# Patient Record
Sex: FEMALE | Race: WHITE | Employment: FULL TIME | URBAN - METROPOLITAN AREA
[De-identification: names, ages, dates, MRNs, and addresses within clinical notes are randomized per-mention and may not be internally consistent; named-entity substitution may affect disease eponyms.]

---

## 2019-04-12 ENCOUNTER — OFFICE VISIT (OUTPATIENT)
Dept: PODIATRY | Facility: CLINIC | Age: 39
End: 2019-04-12

## 2019-04-12 VITALS — TEMPERATURE: 98.6 F | WEIGHT: 280 LBS | HEIGHT: 69 IN | BODY MASS INDEX: 41.47 KG/M2

## 2019-04-12 DIAGNOSIS — M79.671 PAIN IN BOTH FEET: Primary | ICD-10-CM

## 2019-04-12 DIAGNOSIS — M20.5X1 ACQUIRED HALLUX LIMITUS OF BOTH FEET: ICD-10-CM

## 2019-04-12 DIAGNOSIS — M72.2 BILATERAL PLANTAR FASCIITIS: ICD-10-CM

## 2019-04-12 DIAGNOSIS — M20.5X2 ACQUIRED HALLUX LIMITUS OF BOTH FEET: ICD-10-CM

## 2019-04-12 DIAGNOSIS — M79.672 PAIN IN BOTH FEET: Primary | ICD-10-CM

## 2019-04-12 RX ORDER — AMLODIPINE BESYLATE 10 MG/1
10 TABLET ORAL DAILY
COMMUNITY
End: 2021-07-22

## 2019-04-12 RX ORDER — LOSARTAN POTASSIUM 50 MG/1
100 TABLET ORAL DAILY
COMMUNITY

## 2019-04-12 ASSESSMENT — ENCOUNTER SYMPTOMS
SHORTNESS OF BREATH: 0
DIARRHEA: 0
NAUSEA: 0
BACK PAIN: 1
CONSTIPATION: 0

## 2019-04-12 NOTE — PATIENT INSTRUCTIONS
Patient Education        Plantar Fasciitis: Exercises  Your Care Instructions  Here are some examples of typical rehabilitation exercises for your condition. Start each exercise slowly. Ease off the exercise if you start to have pain. Your doctor or physical therapist will tell you when you can start these exercises and which ones will work best for you. How to do the exercises  Towel stretch    1. Sit with your legs extended and knees straight. 2. Place a towel around your foot just under the toes. 3. Hold each end of the towel in each hand, with your hands above your knees. 4. Pull back with the towel so that your foot stretches toward you. 5. Hold the position for at least 15 to 30 seconds. 6. Repeat 2 to 4 times a session, up to 5 sessions a day. Calf stretch    1. Stand facing a wall with your hands on the wall at about eye level. Put the leg you want to stretch about a step behind your other leg. 2. Keeping your back heel on the floor, bend your front knee until you feel a stretch in the back leg. 3. Hold the stretch for 15 to 30 seconds. Repeat 2 to 4 times. Plantar fascia and calf stretch    1. Stand on a step as shown above. Be sure to hold on to the banister. 2. Slowly let your heels down over the edge of the step as you relax your calf muscles. You should feel a gentle stretch across the bottom of your foot and up the back of your leg to your knee. 3. Hold the stretch about 15 to 30 seconds, and then tighten your calf muscle a little to bring your heel back up to the level of the step. Repeat 2 to 4 times. Towel curls    1. While sitting, place your foot on a towel on the floor and scrunch the towel toward you with your toes. 2. Then, also using your toes, push the towel away from you. [place] pickups    1. Put marbles on the floor next to a cup.  2. Using your toes, try to lift the marbles up from the floor and put them in the cup.     Follow-up care is a key part of to 4 times. Plantar fascia and calf stretch    4. Stand on a step as shown above. Be sure to hold on to the banister. 5. Slowly let your heels down over the edge of the step as you relax your calf muscles. You should feel a gentle stretch across the bottom of your foot and up the back of your leg to your knee. 6. Hold the stretch about 15 to 30 seconds, and then tighten your calf muscle a little to bring your heel back up to the level of the step. Repeat 2 to 4 times. Towel curls    3. While sitting, place your foot on a towel on the floor and scrunch the towel toward you with your toes. 4. Then, also using your toes, push the towel away from you. [place] pickups    3. Put marbles on the floor next to a cup.  4. Using your toes, try to lift the marbles up from the floor and put them in the cup. Follow-up care is a key part of your treatment and safety. Be sure to make and go to all appointments, and call your doctor if you are having problems. It's also a good idea to know your test results and keep a list of the medicines you take. Where can you learn more? Go to https://Domino.TicketsNow. org and sign in to your [employer] account. Enter P320 in the Bizible box to learn more about \"Plantar Fasciitis: Exercises. \"     If you do not have an account, please click on the \"Sign Up Now\" link. Current as of: September 20, 2018  Content Version: 11.9  © 4821-3806 [employer]. Care instructions adapted under license by [facility] ([facility]). If you have questions about a medical condition or this instruction, always ask your healthcare professional. [address] any warranty or liability for your use of this information.

## 2019-04-12 NOTE — PROGRESS NOTES
Sid Brar  (1980)    4/12/19    Subjective     Sid Brar is 41 y.o. female who complains today of:    Chief Complaint   Patient presents with    Foot Pain     HEEL SPURS    Foot Pain     PLANTAR FASCIITIS        HPI: Patient presents with complaint of heel spurs and plantar fasciitis. Patient reports having heel pain for the past month. The patient has been experiencing post-static dyskinesia. She states the left foot is more severe than the right. Patient denies recent trauma. The patient denies similar episodes of pain in the past.  Patient has been taking over-the-counter ibuprofen intermittently. The patient has also obtained medical grade orthotics and arch strappings. She finds the arch strap to be more effective than the orthotic. Patient denies having recent x-rays. 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 The patient works 12 hour shifts which require her to be on her feet most of the time. Patient currently wears Nike running shoes to work. Review of Systems   Constitutional: Negative for fever. HENT: Negative for hearing loss. Respiratory: Negative for shortness of breath. Gastrointestinal: Negative for constipation, diarrhea and nausea. Genitourinary: Negative for difficulty urinating. Musculoskeletal: Positive for back pain. Negative for neck pain. Skin: Negative for rash. Neurological: Negative for headaches. Hematological: Does not bruise/bleed easily. Psychiatric/Behavioral: Positive for sleep disturbance. She has not tried physical therapy. Date of last of last PT treatment: none    The patient is not a diabetic. PCP/ Endocrinologist: Dr. Candie Vila   Date last seen: 1/2019    Allergies:  Patient has no known allergies.     Current Outpatient Medications on File Prior to Visit   Medication Sig Dispense Refill    losartan (COZAAR) 50 MG tablet Take 50 mg by mouth daily      amLODIPine (NORVASC) 10 MG tablet Take 10 mg by mouth daily       No current facility-administered medications on file prior to visit. Past Medical History:   Diagnosis Date    H/O: hypertension      History reviewed. No pertinent surgical history. Social History     Socioeconomic History    Marital status: Unknown     Spouse name: Not on file    Number of children: Not on file    Years of education: Not on file    Highest education level: Not on file   Occupational History    Not on file   Social Needs    Financial resource strain: Not on file    Food insecurity:     Worry: Not on file     Inability: Not on file    Transportation needs:     Medical: Not on file     Non-medical: Not on file   Tobacco Use    Smoking status: Never Smoker   Substance and Sexual Activity    Alcohol use: Yes    Drug use: Not on file    Sexual activity: Not on file   Lifestyle    Physical activity:     Days per week: Not on file     Minutes per session: Not on file    Stress: Not on file   Relationships    Social connections:     Talks on phone: Not on file     Gets together: Not on file     Attends Hoahaoism service: Not on file     Active member of club or organization: Not on file     Attends meetings of clubs or organizations: Not on file     Relationship status: Not on file    Intimate partner violence:     Fear of current or ex partner: Not on file     Emotionally abused: Not on file     Physically abused: Not on file     Forced sexual activity: Not on file   Other Topics Concern    Not on file   Social History Narrative    Not on file     History reviewed. No pertinent family history. Objective:   Vitals:  Temp 98.6 °F (37 °C)   Ht 5' 9\" (1.753 m)   Wt 280 lb (127 kg)   BMI 41.35 kg/m² Pain Score:   7    Physical Exam    Constitutional:     Well nourished and well developed. Appears neat and clean. Patient is alert, oriented x3, and in no apparent distress.      Vascular:   Dorsalis pedis pulse is palpable bilateral foot.  Posterior tibial pulse is palpable bilateral foot. There is no edema noted to the bilateral lower extremity. Capillary refill is immediate bilateral hallux. There are no varicosities noted bilaterally. There are no telangiectasia noted bilaterally. Skin temperature gradient is noted to be warm to warm bilaterally. There are no signs of ischemia or skin atrophy noted bilaterally. Hair growth is present bilaterally. Neurological:   Light touch and protective sensation is intact bilaterally. Patellar deep tendon reflex is graded at 2/4 bilaterally. Achilles tendon deep tendon reflex is graded at 2/4 bilaterally. The Babinski response is negative bilaterally. The patient is able to abduct the fifth toe on command bilaterally. Dermatological:  No open lesions noted bilateral foot. Focal hyperkeratotic lesions noted to the medial aspect of the bilateral hallux. The toenails are within normal limits and are well groomed bilaterally. Normal skin turgor noted bilaterally. Webspace 1-4 is dry and intact bilateral foot. Musculoskeletal:  Rectus foot type noted bilaterally. Manual muscle strength is graded at 5/5 for all groups tested bilateral foot. Gross static deformities noted: mild Mervin's deformity noted to the posterior/ lateral calcaneus right foot. Pain or crepitus noted with active or passive range of motion of the joints of the foot or ankle: none. Functional hallux limitus noted bilaterally, no pain or crepitus with range of motion, severely decreased dorsiflexion noted with simulated weightbearing. Decreased ankle joint dorsiflexion bilaterally, soft endpoint noted. There is tenderness to palpation of the bilateral medial calcaneal tubercle, there is pain on palpation of the left foot lateral calcaneal tubercle. There is no pain with medial to lateral compression of the heel bilaterally. Psychiatric:    Judgement and insight intact. Short and long term memory intact. No evidence of depression, anxiety, or agitation. Patient is calm, cooperative, and communicative. Appropriate interactions and affect. Assessment:      Diagnosis Orders   1. Pain in both feet  HI INJECT TENDON SHEATH/LIGAMENT   2. Bilateral plantar fasciitis  HI INJECT TENDON SHEATH/LIGAMENT    HI FT INSERT UCB AMANDA SHELL   3. Acquired hallux limitus of both feet  HI FT INSERT UCB AMANDA SHELL       Plan:     Plantar fasciitis:  I discussed the nature and etiology of the condition with the patient in detail. Posterior calf stretching exercises were demonstrated to patient and these are to be performed three times per day, a detailed home exercise plan was dispensed to the patient. An icepack is to be applied to the heel for 10 minutes after the stretching is performed. Patient instructed to modify her existing off-the-shelf orthotics with a first ray cut out. Replaced for custom orthotics, we may consider this in the future. She may alternate the orthotics with the arch strapping. We will order x-rays at the next visit if her symptoms persist.    Due to the recalcitrant nature of the pain, it was decided today to proceed with an injection to the left plantar fascia origin. SURGEON: Benoit Alonso DPM     PRE-OP DIAGNOSIS: plantar fasciitis left foot                                POST-OP DIAGNOSIS: Same    The potential risks and complications, including but not limited to, infection were discussed with the patient, informed consent was obtained. The left foot was prepped in the usual aseptic manner. The following procedures were then performed:     Procedure: Local anesthetic/ corticosteroid injection. Attention was directed to the medial aspect of the left heel. The point of maximal tenderness along the origin of the plantar fascia was identified.     An injection consisting of 1 mL of 0.5% Marcaine plain, 1 mL of 10 mg/mL dexamethasone, and 1 mL of 10 mg/mL betamethasone was administered at the site. A bandage was applied. The patient tolerated the procedure well and left the operating room in apparent good condition. After care instructions were discussed prior to discharge. Orders Placed This Encounter   Procedures    SD INJECT TENDON SHEATH/LIGAMENT    SD FT INSERT UCB AMANDA SHELL     Prescriptioin: Qty 2     Standing Status:   Future     Standing Expiration Date:   7/11/2019         Follow up:  Return in about 2 weeks (around 4/26/2019). Aida Carver DPM      Please note that this report has been partially produced using speech recognition software which may cause errors including grammar, punctuation, and spelling or words and phrases that may seem inappropriate. If there are questions or concerns please feel free to contact me for clarification.

## 2019-04-17 ENCOUNTER — TELEPHONE (OUTPATIENT)
Dept: PODIATRY | Facility: CLINIC | Age: 39
End: 2019-04-17

## 2019-04-24 NOTE — TELEPHONE ENCOUNTER
I called the patient to explain her her insurance details for orthotics. I left a voicemail for patient to call.

## 2019-04-26 ENCOUNTER — OFFICE VISIT (OUTPATIENT)
Dept: PODIATRY | Facility: CLINIC | Age: 39
End: 2019-04-26

## 2019-04-26 VITALS — HEIGHT: 69 IN | WEIGHT: 290 LBS | BODY MASS INDEX: 42.95 KG/M2

## 2019-04-26 DIAGNOSIS — M20.5X2 ACQUIRED HALLUX LIMITUS OF BOTH FEET: ICD-10-CM

## 2019-04-26 DIAGNOSIS — M79.672 PAIN IN BOTH FEET: Primary | ICD-10-CM

## 2019-04-26 DIAGNOSIS — M79.671 PAIN IN BOTH FEET: Primary | ICD-10-CM

## 2019-04-26 DIAGNOSIS — M72.2 BILATERAL PLANTAR FASCIITIS: ICD-10-CM

## 2019-04-26 DIAGNOSIS — M20.5X1 ACQUIRED HALLUX LIMITUS OF BOTH FEET: ICD-10-CM

## 2019-04-26 ASSESSMENT — ENCOUNTER SYMPTOMS
NAUSEA: 0
BACK PAIN: 1
DIARRHEA: 0
CONSTIPATION: 0
SHORTNESS OF BREATH: 0

## 2019-04-26 NOTE — PROGRESS NOTES
[facility]  (1980)    4/26/19    Subjective     [facility] is 41 y.o. female who complains today of:    Chief Complaint   Patient presents with    Foot Pain       HPI: The patient present for follow-up for bilateral plantar fasciitis. She reports a 40% decrease in her pain. Her pain is now rated at 6/10 after her work shifts. Patient reports compliance with home going instructions including stretching, icing, use of foot strapping and use of arch supports. The patient states that the left foot is still more painful than the right foot. She continues to have post-static dyskinesia. Review of Systems   Constitutional: Negative for fever. HENT: Negative for hearing loss. Respiratory: Negative for shortness of breath. Gastrointestinal: Negative for constipation, diarrhea and nausea. Genitourinary: Negative for difficulty urinating. Musculoskeletal: Positive for back pain. Negative for neck pain. Skin: Negative for rash. Neurological: Negative for headaches. Hematological: Does not bruise/bleed easily. Psychiatric/Behavioral: Positive for sleep disturbance. She has not tried physical therapy. Date of last of last PT treatment: none      Allergies:  Patient has no known allergies. Current Outpatient Medications on File Prior to Visit   Medication Sig Dispense Refill    losartan (COZAAR) 50 MG tablet Take 50 mg by mouth daily      amLODIPine (NORVASC) 10 MG tablet Take 10 mg by mouth daily       No current facility-administered medications on file prior to visit. Past Medical History:   Diagnosis Date    H/O: hypertension      History reviewed. No pertinent surgical history.   Social History     Socioeconomic History    Marital status: Unknown     Spouse name: Not on file    Number of children: Not on file    Years of education: Not on file    Highest education level: Not on file   Occupational History    Not on file   Social Needs  Financial resource strain: Not on [place] insecurity:     Worry: Not on file     Inability: Not on file    Transportation needs:     Medical: Not on file     Non-medical: Not on file   Tobacco Use    Smoking status: Never Smoker    Smokeless tobacco: Never Used   Substance and Sexual Activity    Alcohol use: Yes    Drug use: Not on file    Sexual activity: Not on file   Lifestyle    Physical activity:     Days per week: Not on file     Minutes per session: Not on file    Stress: Not on file   Relationships    Social connections:     Talks on phone: Not on file     Gets together: Not on file     Attends Sikhism service: Not on file     Active member of club or organization: Not on file     Attends meetings of clubs or organizations: Not on file     Relationship status: Not on file    Intimate partner violence:     Fear of current or ex partner: Not on file     Emotionally abused: Not on file     Physically abused: Not on file     Forced sexual activity: Not on file   Other Topics Concern    Not on file   Social History Narrative    Not on file     History reviewed. No pertinent family history. Objective:   Vitals:  Ht 5' 9\" (1.753 m)   Wt 290 lb (131.5 kg)   BMI 42.83 kg/m² Pain Score:   6    Physical Exam    Vascular:   Dorsalis pedis pulse is palpable bilateral foot. Posterior tibial pulse is palpable bilateral foot. There is no edema noted to the bilateral lower extremity. Capillary refill is immediate bilateral hallux. There are no varicosities noted bilaterally. There are no telangiectasia noted bilaterally. Skin temperature gradient is noted to be warm to warm bilaterally. There are no signs of ischemia or skin atrophy noted bilaterally. Hair growth is present bilaterally.     Neurological:   Light touch and protective sensation is intact bilaterally. Patellar deep tendon reflex is graded at 2/4 bilaterally.   Achilles tendon deep tendon reflex is graded at 2/4 bilaterally. The Babinski response is negative bilaterally.     Dermatological:  No open lesions noted bilateral foot. Focal hyperkeratotic lesions noted to the medial aspect of the bilateral hallux. The toenails are within normal limits and are well groomed bilaterally. Normal skin turgor noted bilaterally. Webspace 1-4 is dry and intact bilateral foot.     Musculoskeletal:  Rectus foot type noted bilaterally. Manual muscle strength is graded at 5/5 for all groups tested bilateral foot. Mild Mervin's deformity noted to the posterior/ lateral calcaneus right foot. No pain or crepitus noted with active or passive range of motion of the joints of the foot or ankle. Functional hallux limitus noted bilaterally, no pain or crepitus with range of motion, severely decreased dorsiflexion noted with simulated weightbearing. Decreased ankle joint dorsiflexion bilaterally, soft endpoint noted. There is tenderness to palpation of the bilateral medial calcaneal tubercle, there is pain on palpation of the left foot lateral calcaneal tubercle. There is no pain with medial to lateral compression of the heel bilaterally. Assessment:      Diagnosis Orders   1. Pain in both feet  NC INJECT TENDON SHEATH/LIGAMENT   2. Bilateral plantar fasciitis  NC INJECT TENDON SHEATH/LIGAMENT   3. Acquired hallux limitus of both feet         Plan:     Due to the recalcitrant nature of the pain, it was decided today to proceed with an additional injection to the left foot plantar fascia origin. The patient is instructed to continue previoussly enacted conservative measures including stretching, icing, foot strap, arch support. Procedure Note:    SURGEON: Jerod Guo DPM     PRE-OP DIAGNOSIS: plantar fasciitis                                POST-OP DIAGNOSIS: Same    The potential risks and complications, including but not limited to, infection were discussed with the patient, informed consent was obtained.     The left foot was prepped in the usual aseptic manner. The following procedures were then performed:     Procedure: Local anesthetic/ corticosteroid injection. Attention was directed to the medial aspect of the left heel. The point of maximal tenderness along the origin of the plantar fascia was identified. An injection consisting of 1 mL of 0.5% Marcaine plain, 1 mL of 10 mg/mL dexamethasone, and 1 mL of 10 mg/mL betamethasone was administered at the site. A bandage was applied. The patient tolerated the procedure well and left the operating room in apparent good condition. After care instructions were discussed prior to discharge. Orders Placed This Encounter   Procedures    IA INJECT TENDON SHEATH/LIGAMENT         Follow up:  Return in about 1 month (around 5/26/2019). Kamryn Andrade DPM      Please note that this report has been partially produced using speech recognition software which may cause errors including grammar, punctuation, and spelling or words and phrases that may seem inappropriate. If there are questions or concerns please feel free to contact me for clarification.

## 2019-05-22 ENCOUNTER — OFFICE VISIT (OUTPATIENT)
Dept: PODIATRY | Facility: CLINIC | Age: 39
End: 2019-05-22

## 2019-05-22 VITALS — BODY MASS INDEX: 41.47 KG/M2 | WEIGHT: 280 LBS | HEIGHT: 69 IN | TEMPERATURE: 97.6 F

## 2019-05-22 DIAGNOSIS — M72.2 BILATERAL PLANTAR FASCIITIS: ICD-10-CM

## 2019-05-22 DIAGNOSIS — M20.5X2 ACQUIRED HALLUX LIMITUS OF BOTH FEET: ICD-10-CM

## 2019-05-22 DIAGNOSIS — M79.671 PAIN IN BOTH FEET: Primary | ICD-10-CM

## 2019-05-22 DIAGNOSIS — M79.672 PAIN IN BOTH FEET: Primary | ICD-10-CM

## 2019-05-22 DIAGNOSIS — M20.5X1 ACQUIRED HALLUX LIMITUS OF BOTH FEET: ICD-10-CM

## 2019-05-22 ASSESSMENT — ENCOUNTER SYMPTOMS
NAUSEA: 0
BACK PAIN: 1
SHORTNESS OF BREATH: 0
DIARRHEA: 0
CONSTIPATION: 0

## 2019-05-22 NOTE — PROGRESS NOTES
Worry: Not on file     Inability: Not on file    Transportation needs:     Medical: Not on file     Non-medical: Not on file   Tobacco Use    Smoking status: Never Smoker    Smokeless tobacco: Never Used   Substance and Sexual Activity    Alcohol use: Yes    Drug use: Not on file    Sexual activity: Not on file   Lifestyle    Physical activity:     Days per week: Not on file     Minutes per session: Not on file    Stress: Not on file   Relationships    Social connections:     Talks on phone: Not on file     Gets together: Not on file     Attends Jewish service: Not on file     Active member of club or organization: Not on file     Attends meetings of clubs or organizations: Not on file     Relationship status: Not on file    Intimate partner violence:     Fear of current or ex partner: Not on file     Emotionally abused: Not on file     Physically abused: Not on file     Forced sexual activity: Not on file   Other Topics Concern    Not on file   Social History Narrative    Not on file     History reviewed. No pertinent family history. Objective:   Vitals:  Temp 97.6 °F (36.4 °C) (Tympanic)   Ht 5' 9\" (1.753 m)   Wt 280 lb (127 kg)   BMI 41.35 kg/m² Pain Score:   4    Physical Exam  Vascular:   Dorsalis pedis pulse is palpable bilateral foot. Posterior tibial pulse is palpable bilateral foot. There is no edema noted to the bilateral lower extremity. Capillary refill is immediate bilateral hallux. There are no varicosities noted bilaterally. There are no telangiectasia noted bilaterally. Skin temperature gradient is noted to be warm to warm bilaterally. There are no signs of ischemia or skin atrophy noted bilaterally. Hair growth is present bilaterally.     Neurological:   Light touch and protective sensation is intact bilaterally. Patellar deep tendon reflex is graded at 2/4 bilaterally. Achilles tendon deep tendon reflex is graded at 2/4 bilaterally.   The Babinski response is negative bilaterally.     Dermatological:  No open lesions noted bilateral foot. Focal hyperkeratotic lesions noted to the medial aspect of the bilateral hallux. The toenails are within normal limits and are well groomed bilaterally. Normal skin turgor noted bilaterally. Webspace 1-4 is dry and intact bilateral foot.     Musculoskeletal:  Rectus foot type noted bilaterally. Manual muscle strength is graded at 5/5 for all groups tested bilateral foot. Mild Mervin's deformity noted to the posterior/ lateral calcaneus right foot. No pain or crepitus noted with active or passive range of motion of the joints of the foot or ankle. Functional hallux limitus noted bilaterally, no pain or crepitus with range of motion, severely decreased dorsiflexion noted with simulated weightbearing. Decreased ankle joint dorsiflexion bilaterally, soft endpoint noted. There is tenderness to palpation of the bilateral medial calcaneal tubercle, there is no longer pain on palpation of the left foot lateral calcaneal tubercle. Radiographs: no acute cortical defects noted, no appreciable stress reaction noted to the calcaneus. There is an enthesophyte formation noted at the insertion of the Achilles tendon and a small infracalcaneal spur noted at the plantar fascia origin. Assessment:      Diagnosis Orders   1. Pain in both feet  XR Foot Left Ap Lateral and Oblique Standing    Ambulatory referral to Physical Therapy   2. Bilateral plantar fasciitis  XR Foot Left Ap Lateral and Oblique Standing    Ambulatory referral to Physical Therapy   3. Acquired hallux limitus of both feet         Plan:     Radiographs of the left foot were obtained and reviewed. Patient is to continue the previously enacted conservative measures. An order for formal physical therapy was dispensed to the patient. Patient is to call/return to clinic sooner should the severe symptoms recur.     Orders Placed This Encounter Procedures    XR Foot Left Ap Lateral and Oblique Standing     Standing Status:   Future     Standing Expiration Date:   5/22/2020     Order Specific Question:   Reason for exam:     Answer:   pain, plantar fasciitis (heel spur)    Ambulatory referral to Physical Therapy     Referral Priority:   Routine     Referral Type:   Eval and Treat     Requested Specialty:   Physical Therapy     Number of Visits Requested:   1         Follow up:  Return in about 6 weeks (around 7/3/2019). Agueda Alex DPM      Please note that this report has been partially produced using speech recognition software which may cause errors including grammar, punctuation, and spelling or words and phrases that may seem inappropriate. If there are questions or concerns please feel free to contact me for clarification.

## 2019-07-01 ENCOUNTER — OFFICE VISIT (OUTPATIENT)
Dept: PODIATRY | Facility: CLINIC | Age: 39
End: 2019-07-01

## 2019-07-01 VITALS — TEMPERATURE: 98.1 F | WEIGHT: 280 LBS | BODY MASS INDEX: 41.47 KG/M2 | HEIGHT: 69 IN

## 2019-07-01 DIAGNOSIS — M76.62 TENDONITIS, ACHILLES, LEFT: ICD-10-CM

## 2019-07-01 DIAGNOSIS — M79.672 PAIN IN BOTH FEET: Primary | ICD-10-CM

## 2019-07-01 DIAGNOSIS — M72.2 BILATERAL PLANTAR FASCIITIS: ICD-10-CM

## 2019-07-01 DIAGNOSIS — M79.671 PAIN IN BOTH FEET: Primary | ICD-10-CM

## 2019-07-01 DIAGNOSIS — M76.72 PERONEAL TENDONITIS, LEFT: ICD-10-CM

## 2019-07-01 RX ORDER — MELOXICAM 15 MG/1
15 TABLET ORAL DAILY
Qty: 30 TABLET | Refills: 0 | Status: ON HOLD | OUTPATIENT
Start: 2019-07-01 | End: 2021-06-29 | Stop reason: ALTCHOICE

## 2019-07-01 ASSESSMENT — ENCOUNTER SYMPTOMS
SHORTNESS OF BREATH: 0
NAUSEA: 0
CONSTIPATION: 0
BACK PAIN: 1
DIARRHEA: 0

## 2019-07-01 NOTE — PROGRESS NOTES
Kristopher Morrison  (1980)    7/1/19    Subjective     Kristopher Morrison is 41 y.o. female who complains today of:    Chief Complaint   Patient presents with    Foot Pain       HPI: the patient presents for follow-up for bilateral plantar fasciitis. The patient states that she did receive the night splint and begin using it. The patient reports decreased pain to the left plantar first steps out of bed in the morning. However the patient has developed new pain to the left foot and ankle and has noticed swelling to the lateral left ankle. The patient does not recall trauma to the left foot or ankle. No bruising has developed in the area. Patient has been taking four 200 mg over-the-counter ibuprofen twice daily, this has been ineffective. Review of Systems   Constitutional: Negative for fever. HENT: Negative for hearing loss. Respiratory: Negative for shortness of breath. Gastrointestinal: Negative for constipation, diarrhea and nausea. Genitourinary: Negative for difficulty urinating. Musculoskeletal: Positive for back pain. Negative for neck pain. Skin: Negative for rash. Neurological: Negative for headaches. Hematological: Does not bruise/bleed easily. Psychiatric/Behavioral: Positive for sleep disturbance. She has not tried physical therapy. Date of last of last PT treatment: none        Allergies:  Patient has no known allergies. Current Outpatient Medications on File Prior to Visit   Medication Sig Dispense Refill    losartan (COZAAR) 50 MG tablet Take 50 mg by mouth daily      amLODIPine (NORVASC) 10 MG tablet Take 10 mg by mouth daily       No current facility-administered medications on file prior to visit. Past Medical History:   Diagnosis Date    H/O: hypertension      History reviewed. No pertinent surgical history.   Social History     Socioeconomic History    Marital status: Unknown     Spouse name: Not on file    Number of children: Not on

## 2019-08-09 ENCOUNTER — OFFICE VISIT (OUTPATIENT)
Dept: PODIATRY | Facility: CLINIC | Age: 39
End: 2019-08-09

## 2019-08-09 VITALS — TEMPERATURE: 97.6 F | WEIGHT: 280 LBS | BODY MASS INDEX: 41.47 KG/M2 | HEIGHT: 69 IN

## 2019-08-09 DIAGNOSIS — M76.72 PERONEAL TENDONITIS, LEFT: ICD-10-CM

## 2019-08-09 DIAGNOSIS — M79.671 PAIN IN BOTH FEET: Primary | ICD-10-CM

## 2019-08-09 DIAGNOSIS — M72.2 BILATERAL PLANTAR FASCIITIS: ICD-10-CM

## 2019-08-09 DIAGNOSIS — M79.672 PAIN IN BOTH FEET: Primary | ICD-10-CM

## 2019-08-09 RX ORDER — MELOXICAM 15 MG/1
15 TABLET ORAL DAILY PRN
Qty: 30 TABLET | Refills: 1 | Status: SHIPPED | OUTPATIENT
Start: 2019-08-09 | End: 2021-07-07

## 2019-08-09 ASSESSMENT — ENCOUNTER SYMPTOMS
BACK PAIN: 1
NAUSEA: 0
CONSTIPATION: 0
DIARRHEA: 0
SHORTNESS OF BREATH: 0

## 2019-08-09 NOTE — PROGRESS NOTES
recommended the patient not go to work if she is having severe pain of the heels due to the nature of her position. We did discuss that plantar fasciitis is usually a self-limiting problem, we will have to continue to monitor the situation closely. Orders Placed This Encounter   Medications    meloxicam (MOBIC) 15 MG tablet     Sig: Take 1 tablet by mouth daily as needed for Pain     Dispense:  30 tablet     Refill:  1         Follow up:  Return in about 6 weeks (around 9/20/2019). Amarjit Angelo DPM      Please note that this report has been partially produced using speech recognition software which may cause errors including grammar, punctuation, and spelling or words and phrases that may seem inappropriate. If there are questions or concerns please feel free to contact me for clarification.

## 2019-09-23 ENCOUNTER — OFFICE VISIT (OUTPATIENT)
Dept: PODIATRY | Facility: CLINIC | Age: 39
End: 2019-09-23

## 2019-09-23 VITALS — TEMPERATURE: 98 F | BODY MASS INDEX: 41.47 KG/M2 | WEIGHT: 280 LBS | HEIGHT: 69 IN

## 2019-09-23 DIAGNOSIS — M79.672 LEFT FOOT PAIN: Primary | ICD-10-CM

## 2019-09-23 DIAGNOSIS — M76.62 TENDONITIS, ACHILLES, LEFT: ICD-10-CM

## 2019-09-23 DIAGNOSIS — M72.2 PLANTAR FASCIITIS: ICD-10-CM

## 2019-09-23 ASSESSMENT — ENCOUNTER SYMPTOMS
SHORTNESS OF BREATH: 0
NAUSEA: 0
VOMITING: 0

## 2020-08-14 ENCOUNTER — OFFICE VISIT (OUTPATIENT)
Dept: PODIATRY | Facility: CLINIC | Age: 40
End: 2020-08-14

## 2020-08-14 VITALS — HEIGHT: 69 IN | TEMPERATURE: 97.4 F | WEIGHT: 293 LBS | BODY MASS INDEX: 43.4 KG/M2

## 2020-08-14 RX ORDER — METHYLPREDNISOLONE 4 MG/1
TABLET ORAL
Qty: 1 KIT | Refills: 0 | Status: SHIPPED | OUTPATIENT
Start: 2020-08-14 | End: 2020-08-20

## 2020-08-14 ASSESSMENT — ENCOUNTER SYMPTOMS
SHORTNESS OF BREATH: 0
NAUSEA: 0
VOMITING: 0

## 2020-08-14 NOTE — PROGRESS NOTES
H/O: hypertension      History reviewed. No pertinent surgical history. Social History     Socioeconomic History    Marital status: Unknown     Spouse name: Not on file    Number of children: Not on file    Years of education: Not on file    Highest education level: Not on file   Occupational History    Not on file   Social Needs    Financial resource strain: Not on file    Food insecurity     Worry: Not on file     Inability: Not on file    Transportation needs     Medical: Not on file     Non-medical: Not on file   Tobacco Use    Smoking status: Never Smoker    Smokeless tobacco: Never Used   Substance and Sexual Activity    Alcohol use: Yes    Drug use: Not on file    Sexual activity: Not on file   Lifestyle    Physical activity     Days per week: Not on file     Minutes per session: Not on file    Stress: Not on file   Relationships    Social connections     Talks on phone: Not on file     Gets together: Not on file     Attends Congregation service: Not on file     Active member of club or organization: Not on file     Attends meetings of clubs or organizations: Not on file     Relationship status: Not on file    Intimate partner violence     Fear of current or ex partner: Not on file     Emotionally abused: Not on file     Physically abused: Not on file     Forced sexual activity: Not on file   Other Topics Concern    Not on file   Social History Narrative    Not on file     History reviewed. No pertinent family history. Objective:   Vitals:  Temp 97.4 °F (36.3 °C)   Ht 5' 9\" (1.753 m)   Wt 300 lb (136.1 kg)   BMI 44.30 kg/m² Pain Score:   7    Physical Exam  Vascular:   Dorsalis pedis pulse is palpable bilateral foot. Posterior tibial pulse is palpable bilateral foot. There trace edema noted to the dorsum of the lateral left foot. Capillary refill is immediate bilateral hallux. There are no varicosities noted bilaterally. There are no telangiectasia noted bilaterally.   Skin temperature gradient is noted to be warm to warm bilaterally. There are no signs of ischemia or skin atrophy noted bilaterally. Hair growtmh is present bilaterally.     Neurological:   Light touch sensation is intact bilaterally. Patellar deep tendon reflex is graded at 2/4 bilaterally. Achilles tendon deep tendon reflex is graded at 2/4 bilaterally. The Babinski response is negative bilaterally.     Dermatological:  No open lesions noted bilateral foot. Focal hyperkeratotic lesions noted to the medial aspect of the bilateral hallux. The toenails are within normal limits and are well groomed bilaterally. Normal skin turgor noted bilaterally. Webspace 1-4 is dry and intact bilateral foot. No ecchymosis or erythema noted to the left foot.      Musculoskeletal:  Rectus foot type noted bilaterally. Manual muscle strength is graded at 5/5 for all groups tested bilateral foot. There is no pain with resisted eversion of the left foot, there is no pain on palpation of the peroneal tendons. Mild Mervin's deformity noted to the posterior/ lateral calcaneus right foot. Functional hallux limitus noted bilaterally. Decreased ankle joint dorsiflexion bilaterally, soft endpoint noted. There is tenderness to palpation of the left 4th and 5th metatarsals, there is pain with range of motion of the corresponding tarsometatarsal joints. Radiographs:                Assessment:      Diagnosis Orders   1. Left foot pain  XR Foot Left Ap Lateral and Oblique Standing   2. Tarsometatarsal (joint) (ligament) sprain, left, initial encounter         Plan:     Radiographs of the left foot were reviewed. The patient sprained her fifth tarsometatarsal joints. No fractures noted to the base of the metatarsal bone. I have recommended the patient ice the foot several times per day for 10 minutes, every 4-5 hours at work. Patient should also use this time to perform range of motion exercises.   Patient instructed to wear a fracture boot to the left foot while standing or walking for the next 3 weeks. Patient instructed to avoid high impact activities. Patient is to call if she is unable to tolerate working a complete shift while wearing the boot. I prescribed a Medrol Dosepak, take as directed. I have also recommended over-the-counter topical Voltaren gel, apply to the painful area up to 4 times per day. Patient instructions to call and schedule appointment in 3 weeks if her symptoms have not improved or worsened. Follow up:  Return if symptoms worsen or fail to improve. Kristen Barrios DPM      Please note that this report has been partially produced using speech recognition software which may cause errors including grammar, punctuation, and spelling or words and phrases that may seem inappropriate. If there are questions or concerns please feel free to contact me for clarification.

## 2021-04-29 ENCOUNTER — OFFICE VISIT (OUTPATIENT)
Dept: PODIATRY | Facility: CLINIC | Age: 41
End: 2021-04-29

## 2021-04-29 VITALS — TEMPERATURE: 97.3 F | HEIGHT: 69 IN | WEIGHT: 293 LBS | BODY MASS INDEX: 43.4 KG/M2

## 2021-04-29 DIAGNOSIS — M79.672 LEFT FOOT PAIN: Primary | ICD-10-CM

## 2021-04-29 DIAGNOSIS — M72.2 PLANTAR FASCIITIS, LEFT: ICD-10-CM

## 2021-04-29 ASSESSMENT — ENCOUNTER SYMPTOMS
VOMITING: 0
CONSTIPATION: 0
SHORTNESS OF BREATH: 0
DIARRHEA: 0
NAUSEA: 0
BACK PAIN: 1

## 2021-04-29 NOTE — PROGRESS NOTES
Rae Kennedy  (1980)    04/29/2021    Subjective     Rae Kennedy is 45 y.o. female who complains today of:    Chief Complaint   Patient presents with    Foot Pain     Left       HPI: Patient presents for follow-up for left heel pain. Patient states that she has been experiencing increased left heel pain over her baseline chronic left heel pain. Patient has been experiencing pain since 2019. Patient reports symptoms are consistent with post static dyskinesia/plantar fasciitis. Patient states that she currently will work 8 to 10 days on her own depending on how her schedule is made, the pain becomes excruciating towards the end of these runs of concurrent shifts. Patient states that she has been wearing supportive shoes, stretching, and icing. Review of Systems   Constitutional: Negative for chills and fever. HENT: Negative for hearing loss. Respiratory: Negative for shortness of breath. Cardiovascular: Negative for chest pain. Gastrointestinal: Negative for constipation, diarrhea, nausea and vomiting. Genitourinary: Negative for difficulty urinating. Musculoskeletal: Positive for back pain. Negative for gait problem and neck pain. Skin: Negative for rash and wound. Neurological: Negative for numbness and headaches. Hematological: Does not bruise/bleed easily. Psychiatric/Behavioral: Positive for sleep disturbance. She has not tried physical therapy. Date of last of last PT treatment: none      Allergies:  Patient has no known allergies.     Current Outpatient Medications on File Prior to Visit   Medication Sig Dispense Refill    meloxicam (MOBIC) 15 MG tablet Take 1 tablet by mouth daily as needed for Pain 30 tablet 1    meloxicam (MOBIC) 15 MG tablet Take 1 tablet by mouth daily 30 tablet 0    losartan (COZAAR) 50 MG tablet Take 50 mg by mouth daily      amLODIPine (NORVASC) 10 MG tablet Take 10 mg by mouth daily       No current facility-administered medications on file prior to visit. Past Medical History:   Diagnosis Date    H/O: hypertension      History reviewed. No pertinent surgical history. Social History     Socioeconomic History    Marital status: Unknown     Spouse name: Not on file    Number of children: Not on file    Years of education: Not on file    Highest education level: Not on file   Occupational History    Not on file   Social Needs    Financial resource strain: Not on file    Food insecurity     Worry: Not on file     Inability: Not on file    Transportation needs     Medical: Not on file     Non-medical: Not on file   Tobacco Use    Smoking status: Never Smoker    Smokeless tobacco: Never Used   Substance and Sexual Activity    Alcohol use: Yes    Drug use: Not on file    Sexual activity: Not on file   Lifestyle    Physical activity     Days per week: Not on file     Minutes per session: Not on file    Stress: Not on file   Relationships    Social connections     Talks on phone: Not on file     Gets together: Not on file     Attends Latter day service: Not on file     Active member of club or organization: Not on file     Attends meetings of clubs or organizations: Not on file     Relationship status: Not on file    Intimate partner violence     Fear of current or ex partner: Not on file     Emotionally abused: Not on file     Physically abused: Not on file     Forced sexual activity: Not on file   Other Topics Concern    Not on file   Social History Narrative    Not on file     History reviewed. No pertinent family history. Objective:   Vitals:  Temp 97.3 °F (36.3 °C)   Ht 5' 9\" (1.753 m)   Wt 300 lb (136.1 kg)   BMI 44.30 kg/m² Pain Score:   5    Physical Exam  Vascular:   Dorsalis pedis pulse is palpable bilateral foot. Posterior tibial pulse is palpable bilateral foot. There no edema noted bilaterally. Capillary refill is immediate bilateral hallux.   There are no varicosities noted bilaterally. There are no telangiectasia noted bilaterally. Skin temperature gradient is noted to be warm to warm bilaterally. There are no signs of ischemia or skin atrophy noted bilaterally. Hair growtmh is present bilaterally.     Neurological:   Light touch sensation is intact bilaterally. Patellar deep tendon reflex is graded at 2/4 bilaterally. Achilles tendon deep tendon reflex is graded at 2/4 bilaterally. The Babinski response is negative bilaterally.     Dermatological:  No open lesions noted bilateral foot. Focal hyperkeratotic lesions noted to the medial aspect of the bilateral hallux. The toenails are within normal limits and are well groomed bilaterally. Normal skin turgor noted bilaterally. Webspace 1-4 is dry and intact bilateral foot.     Musculoskeletal:  Rectus foot type noted bilaterally. Manual muscle strength is graded at 5/5 for all groups tested bilateral foot. Mild Mervin's deformity noted to the posterior/ lateral calcaneus right foot, there is no tenderness to palpation of this bony prominence. Functional hallux limitus noted bilaterally. Decreased ankle joint dorsiflexion bilaterally, soft endpoint noted. There is tenderness to palpation of the medial calcaneal tubercle left foot, minimal pain with medial to lateral compression of the heel. Assessment:      Diagnosis Orders   1. Left foot pain  DE INJECT TENDON SHEATH/LIGAMENT   2. Plantar fasciitis, left  DE INJECT TENDON SHEATH/LIGAMENT       Plan:     Due to the recalcitrant nature and severity of her left heel pain it was decided today to proceed with a corticosteroid injection. SURGEON: Carrington Cabrera DPM     PRE-OP DIAGNOSIS: Left foot plantar fasciitis                                POST-OP DIAGNOSIS: Same    The potential risks and complications, including but not limited to, infection were discussed with the patient, informed consent was obtained.     The left foot was prepped in the usual aseptic manner. The following procedures were then performed:     Procedure: Local anesthetic/ corticosteroid injection. Attention was directed to the medial aspect of the left heel. The point of maximal tenderness along the origin of the plantar fascia was identified. An injection consisting of 1 mL of 1% lidocaine plain, 1 mL of 10 mg/mL dexamethasone, and 1 mL of 10 mg/mL betamethasone was administered at the site. A bandage was applied. The patient tolerated the procedure well and left the operating room in apparent good condition. After care instructions were discussed prior to discharge. Patient is instructed to continue previously an active conservative measures including stretching, icing, and use of supportive shoes/inserts. I recommend the patient not work more than 5 shifts in a row, she states that this is not possible, I explained that she may need to take a day off occasionally due to the severity of the symptoms. I explained the patient that her symptoms appear to be refractory to conservative treatment and that she should consider surgical intervention such as an endoscopic plantar fasciotomy, patient will consider this option. Follow up:  Return if symptoms worsen or fail to improve. Elmer Pitt DPM      Please note that this report has been partially produced using speech recognition software which may cause errors including grammar, punctuation, and spelling or words and phrases that may seem inappropriate. If there are questions or concerns please feel free to contact me for clarification.

## 2021-06-09 ENCOUNTER — TELEPHONE (OUTPATIENT)
Dept: NEUROSURGERY | Age: 41
End: 2021-06-09

## 2021-06-09 DIAGNOSIS — M72.2 PLANTAR FASCIITIS: ICD-10-CM

## 2021-06-09 DIAGNOSIS — M79.672 LEFT FOOT PAIN: Primary | ICD-10-CM

## 2021-06-21 ENCOUNTER — TELEPHONE (OUTPATIENT)
Dept: PODIATRY | Age: 41
End: 2021-06-21

## 2021-06-21 NOTE — TELEPHONE ENCOUNTER
Patient is scheduled for surgery by Dr. Jackson Delong on 6-. She is requesting to be excused from work. How long after surgery will Dr. Paz Shall excuse patient from work?

## 2021-06-22 NOTE — TELEPHONE ENCOUNTER
Our records show an FMLA form was last filled out in Sept 2020 and patient was last seen by Dr. Minna Carvajal on 4-. She needs an updated form filled out for upcoming surgery.

## 2021-06-23 PROBLEM — E66.01 OBESITY, CLASS III, BMI 40-49.9 (MORBID OBESITY) (HCC): Status: ACTIVE | Noted: 2018-08-18

## 2021-06-23 PROBLEM — I10 HYPERTENSION: Status: ACTIVE | Noted: 2021-06-23

## 2021-06-23 NOTE — TELEPHONE ENCOUNTER
I already completed and signed form for this surgery, not sure I have a way sign a new one until I return

## 2021-06-24 ENCOUNTER — OFFICE VISIT (OUTPATIENT)
Dept: FAMILY MEDICINE CLINIC | Age: 41
End: 2021-06-24
Payer: COMMERCIAL

## 2021-06-24 VITALS
HEIGHT: 69 IN | TEMPERATURE: 96.5 F | DIASTOLIC BLOOD PRESSURE: 84 MMHG | OXYGEN SATURATION: 98 % | HEART RATE: 73 BPM | BODY MASS INDEX: 43.4 KG/M2 | SYSTOLIC BLOOD PRESSURE: 132 MMHG | WEIGHT: 293 LBS

## 2021-06-24 DIAGNOSIS — Z01.818 PRE-OP EXAMINATION: ICD-10-CM

## 2021-06-24 DIAGNOSIS — Z01.818 PRE-OP EXAMINATION: Primary | ICD-10-CM

## 2021-06-24 LAB
ANION GAP SERPL CALCULATED.3IONS-SCNC: 11 MEQ/L (ref 9–15)
APTT: 28.7 SEC (ref 24.4–36.8)
BUN BLDV-MCNC: 15 MG/DL (ref 6–20)
CALCIUM SERPL-MCNC: 9.6 MG/DL (ref 8.5–9.9)
CHLORIDE BLD-SCNC: 102 MEQ/L (ref 95–107)
CO2: 25 MEQ/L (ref 20–31)
CREAT SERPL-MCNC: 0.66 MG/DL (ref 0.5–0.9)
GFR AFRICAN AMERICAN: >60
GFR NON-AFRICAN AMERICAN: >60
GLUCOSE BLD-MCNC: 106 MG/DL (ref 70–99)
HCT VFR BLD CALC: 40.5 % (ref 37–47)
HEMOGLOBIN: 13.9 G/DL (ref 12–16)
INR BLD: 1.1
MCH RBC QN AUTO: 31.7 PG (ref 27–31.3)
MCHC RBC AUTO-ENTMCNC: 34.3 % (ref 33–37)
MCV RBC AUTO: 92.4 FL (ref 82–100)
PDW BLD-RTO: 13.7 % (ref 11.5–14.5)
PLATELET # BLD: 296 K/UL (ref 130–400)
POTASSIUM SERPL-SCNC: 4.3 MEQ/L (ref 3.4–4.9)
PROTHROMBIN TIME: 13.7 SEC (ref 12.3–14.9)
RBC # BLD: 4.38 M/UL (ref 4.2–5.4)
SODIUM BLD-SCNC: 138 MEQ/L (ref 135–144)
WBC # BLD: 10 K/UL (ref 4.8–10.8)

## 2021-06-24 PROCEDURE — 99243 OFF/OP CNSLTJ NEW/EST LOW 30: CPT | Performed by: PHYSICIAN ASSISTANT

## 2021-06-24 PROCEDURE — 93000 ELECTROCARDIOGRAM COMPLETE: CPT | Performed by: PHYSICIAN ASSISTANT

## 2021-06-24 RX ORDER — ATENOLOL 25 MG/1
25 TABLET ORAL DAILY
COMMUNITY

## 2021-06-24 SDOH — ECONOMIC STABILITY: FOOD INSECURITY: WITHIN THE PAST 12 MONTHS, YOU WORRIED THAT YOUR FOOD WOULD RUN OUT BEFORE YOU GOT MONEY TO BUY MORE.: NEVER TRUE

## 2021-06-24 SDOH — ECONOMIC STABILITY: FOOD INSECURITY: WITHIN THE PAST 12 MONTHS, THE FOOD YOU BOUGHT JUST DIDN'T LAST AND YOU DIDN'T HAVE MONEY TO GET MORE.: NEVER TRUE

## 2021-06-24 ASSESSMENT — PATIENT HEALTH QUESTIONNAIRE - PHQ9
1. LITTLE INTEREST OR PLEASURE IN DOING THINGS: 0
SUM OF ALL RESPONSES TO PHQ QUESTIONS 1-9: 0
SUM OF ALL RESPONSES TO PHQ9 QUESTIONS 1 & 2: 0
2. FEELING DOWN, DEPRESSED OR HOPELESS: 0

## 2021-06-24 ASSESSMENT — SOCIAL DETERMINANTS OF HEALTH (SDOH): HOW HARD IS IT FOR YOU TO PAY FOR THE VERY BASICS LIKE FOOD, HOUSING, MEDICAL CARE, AND HEATING?: NOT HARD AT ALL

## 2021-06-24 NOTE — PROGRESS NOTES
Preoperative Consultation      Trey Tinoco  YOB: 1980    Date of Service:  6/24/2021    Vitals:    06/24/21 0911   BP: 132/84   Site: Right Upper Arm   Position: Sitting   Cuff Size: Large Adult   Pulse: 73   Temp: 96.5 °F (35.8 °C)   SpO2: 98%   Weight: (!) 319 lb 3.2 oz (144.8 kg)   Height: 5' 9\" (1.753 m)      Wt Readings from Last 2 Encounters:   06/24/21 (!) 319 lb 3.2 oz (144.8 kg)   04/29/21 300 lb (136.1 kg)     BP Readings from Last 3 Encounters:   06/24/21 132/84        Chief Complaint   Patient presents with    Pre-op Exam     Patient is here for pre-op examination. Allergies   Allergen Reactions    Peanut-Containing Drug Products Itching     Outpatient Medications Marked as Taking for the 6/24/21 encounter (Office Visit) with THEODORE Brewer   Medication Sig Dispense Refill    atenolol (TENORMIN) 25 MG tablet Take 25 mg by mouth daily      losartan (COZAAR) 50 MG tablet Take 100 mg by mouth daily          This patient presents to the office today for a preoperative consultation at the request of surgeon, Dr. Karishma Olson, who plans on performing endoscopic plantar fasciotomy left on June 29 at [facility]. The current problem began 2 years ago, and symptoms have been worsening with time. Conservative therapy: N/A. Planned anesthesia: MAC   Known anesthesia problems: None   Bleeding risk: No recent or remote history of abnormal bleeding  Personal or FH of DVT/PE: No    Patient objection to receiving blood products: No    Patient Active Problem List   Diagnosis    Hypertension    Obesity, Class III, BMI 40-49.9 (morbid obesity) ([facility] [address]    Anxiety    Pre-op examination       Past Medical History:   Diagnosis Date    H/O: hypertension      No past surgical history on file. No family history on file.   Social History     Socioeconomic History    Marital status: Unknown     Spouse name: Not on file    Number of children: Not on file    Years of education: Not on file    Highest education level: Not on file   Occupational History    Not on file   Tobacco Use    Smoking status: Never Smoker    Smokeless tobacco: Never Used   Substance and Sexual Activity    Alcohol use: Yes    Drug use: Not on file    Sexual activity: Not on file   Other Topics Concern    Not on file   Social History Narrative    Not on file     Social Determinants of Health     Financial Resource Strain: Low Risk     Difficulty of Paying Living Expenses: Not hard at all   Food Insecurity: No Food Insecurity    Worried About Running Out of Food in the Last Year: Never true    [address] in the Last Year: Never true   Transportation Needs:     Lack of Transportation (Medical):  Lack of Transportation (Non-Medical):    Physical Activity:     Days of Exercise per Week:     Minutes of Exercise per Session:    Stress:     Feeling of Stress :    Social Connections:     Frequency of Communication with Friends and Family:     Frequency of Social Gatherings with Friends and Family:     Attends Zoroastrianism Services:     Active Member of Clubs or Organizations:     Attends Club or Organization Meetings:     Marital Status:    Intimate Partner Violence:     Fear of Current or Ex-Partner:     Emotionally Abused:     Physically Abused:     Sexually Abused:        Review of Systems  A comprehensive review of systems was negative except for what was noted in the HPI. Physical Exam   Constitutional: She is oriented to person, place, and time. She appears well-developed and well-nourished. No distress. HENT:   Head: Normocephalic and atraumatic. Mouth/Throat: Uvula is midline, oropharynx is clear and moist and mucous membranes are normal.   Eyes: Conjunctivae and EOM are normal. Pupils are equal, round, and reactive to light. Neck: Trachea normal and normal range of motion. Neck supple. No JVD present. Carotid bruit is not present. No mass and no thyromegaly present. Cardiovascular: Normal rate, regular rhythm, normal heart sounds and intact distal pulses. Exam reveals no gallop and no friction rub. No murmur heard. Pulmonary/Chest: Effort normal and breath sounds normal. No respiratory distress. She has no wheezes. She has no rales. Abdominal: Soft. Normal aorta and bowel sounds are normal. She exhibits no distension and no mass. There is no hepatosplenomegaly. No tenderness. Musculoskeletal: She exhibits no edema and no tenderness. Neurological: She is alert and oriented to person, place, and time. She has normal strength. No cranial nerve deficit or sensory deficit. Coordination and gait normal.   Skin: Skin is warm and dry. No rash noted. No erythema. Psychiatric: She has a normal mood and affect. Her behavior is normal.     EKG Interpretation:  normal EKG, normal sinus rhythm, unchanged from previous tracings. Lab Review   No visits with results within 6 Month(s) from this visit. Latest known visit with results is:   No results found for any previous visit. Assessment:       45 y.o. patient with planned surgery as above. Known risk factors for perioperative complications: Hypertension  Current medications which may produce withdrawal symptoms if withheld perioperatively: none      Plan:     1. Preoperative workup as follows: ECG, hemoglobin, hematocrit, electrolytes, creatinine, glucose, coagulation studies  2. Change in medication regimen before surgery: None  3.  Prophylaxis for cardiac events with perioperative beta-blockers: Currently taking  atenolol  ACC/AHA indications for pre-operative beta-blocker use:    · Vascular surgery with history of postitive stress test  · Intermediate or high risk surgery with history of CAD   · Intermediate or high risk surgery with multiple clinical predictors of CAD- 2 of the following: history of compensated or prior heart failure, history of cerebrovascular disease, DM, or renal insufficiency    Routine

## 2021-06-28 NOTE — TELEPHONE ENCOUNTER
Pt to be temporarily totally disabled beginning 6- until approximately 7-. FMLA form filled out and to be placed on Dr. Caterina Rick desk for signature.

## 2021-06-29 ENCOUNTER — ANESTHESIA EVENT (OUTPATIENT)
Dept: OPERATING ROOM | Age: 41
End: 2021-06-29
Payer: COMMERCIAL

## 2021-06-29 ENCOUNTER — HOSPITAL ENCOUNTER (OUTPATIENT)
Age: 41
Setting detail: OUTPATIENT SURGERY
Discharge: HOME | End: 2021-06-29
Attending: PODIATRIST | Admitting: PODIATRIST
Payer: COMMERCIAL

## 2021-06-29 ENCOUNTER — ANESTHESIA (OUTPATIENT)
Dept: OPERATING ROOM | Age: 41
End: 2021-06-29
Payer: COMMERCIAL

## 2021-06-29 VITALS
DIASTOLIC BLOOD PRESSURE: 77 MMHG | TEMPERATURE: 97.4 F | SYSTOLIC BLOOD PRESSURE: 111 MMHG | RESPIRATION RATE: 16 BRPM | HEIGHT: 69 IN | WEIGHT: 293 LBS | BODY MASS INDEX: 43.4 KG/M2 | OXYGEN SATURATION: 97 % | HEART RATE: 75 BPM

## 2021-06-29 VITALS — SYSTOLIC BLOOD PRESSURE: 116 MMHG | OXYGEN SATURATION: 97 % | DIASTOLIC BLOOD PRESSURE: 78 MMHG

## 2021-06-29 DIAGNOSIS — M79.672 LEFT FOOT PAIN: Primary | ICD-10-CM

## 2021-06-29 DIAGNOSIS — M72.2 PLANTAR FASCIITIS, LEFT: ICD-10-CM

## 2021-06-29 LAB
HCG, URINE, POC: NEGATIVE
Lab: NORMAL
NEGATIVE QC PASS/FAIL: NORMAL
POSITIVE QC PASS/FAIL: NORMAL

## 2021-06-29 PROCEDURE — 2580000003 HC RX 258: Performed by: ANESTHESIOLOGY

## 2021-06-29 PROCEDURE — 6370000000 HC RX 637 (ALT 250 FOR IP): Performed by: PODIATRIST

## 2021-06-29 PROCEDURE — 29893 ENDOSCOPIC PLANTR FASCIOTOMY: CPT | Performed by: PODIATRIST

## 2021-06-29 PROCEDURE — 7100000011 HC PHASE II RECOVERY - ADDTL 15 MIN: Performed by: PODIATRIST

## 2021-06-29 PROCEDURE — 6360000002 HC RX W HCPCS: Performed by: NURSE ANESTHETIST, CERTIFIED REGISTERED

## 2021-06-29 PROCEDURE — 2720000010 HC SURG SUPPLY STERILE: Performed by: PODIATRIST

## 2021-06-29 PROCEDURE — 2709999900 HC NON-CHARGEABLE SUPPLY: Performed by: PODIATRIST

## 2021-06-29 PROCEDURE — 6360000002 HC RX W HCPCS: Performed by: PODIATRIST

## 2021-06-29 PROCEDURE — 3700000000 HC ANESTHESIA ATTENDED CARE: Performed by: PODIATRIST

## 2021-06-29 PROCEDURE — 2580000003 HC RX 258: Performed by: PODIATRIST

## 2021-06-29 PROCEDURE — 3700000001 HC ADD 15 MINUTES (ANESTHESIA): Performed by: PODIATRIST

## 2021-06-29 PROCEDURE — 2500000003 HC RX 250 WO HCPCS: Performed by: PODIATRIST

## 2021-06-29 PROCEDURE — 3600000014 HC SURGERY LEVEL 4 ADDTL 15MIN: Performed by: PODIATRIST

## 2021-06-29 PROCEDURE — 6370000000 HC RX 637 (ALT 250 FOR IP): Performed by: ANESTHESIOLOGY

## 2021-06-29 PROCEDURE — 3600000004 HC SURGERY LEVEL 4 BASE: Performed by: PODIATRIST

## 2021-06-29 PROCEDURE — 7100000010 HC PHASE II RECOVERY - FIRST 15 MIN: Performed by: PODIATRIST

## 2021-06-29 RX ORDER — MAGNESIUM HYDROXIDE 1200 MG/15ML
LIQUID ORAL CONTINUOUS PRN
Status: COMPLETED | OUTPATIENT
Start: 2021-06-29 | End: 2021-06-29

## 2021-06-29 RX ORDER — ONDANSETRON 4 MG/1
4 TABLET, ORALLY DISINTEGRATING ORAL 3 TIMES DAILY PRN
Qty: 21 TABLET | Refills: 0 | Status: SHIPPED | OUTPATIENT
Start: 2021-06-29 | End: 2021-08-13

## 2021-06-29 RX ORDER — METOCLOPRAMIDE HYDROCHLORIDE 5 MG/ML
10 INJECTION INTRAMUSCULAR; INTRAVENOUS
Status: DISCONTINUED | OUTPATIENT
Start: 2021-06-29 | End: 2021-06-29 | Stop reason: HOSPADM

## 2021-06-29 RX ORDER — FENTANYL CITRATE 50 UG/ML
INJECTION, SOLUTION INTRAMUSCULAR; INTRAVENOUS PRN
Status: DISCONTINUED | OUTPATIENT
Start: 2021-06-29 | End: 2021-06-29 | Stop reason: SDUPTHER

## 2021-06-29 RX ORDER — HYDROCODONE BITARTRATE AND ACETAMINOPHEN 5; 325 MG/1; MG/1
2 TABLET ORAL PRN
Status: COMPLETED | OUTPATIENT
Start: 2021-06-29 | End: 2021-06-29

## 2021-06-29 RX ORDER — GINSENG 100 MG
CAPSULE ORAL PRN
Status: DISCONTINUED | OUTPATIENT
Start: 2021-06-29 | End: 2021-06-29 | Stop reason: ALTCHOICE

## 2021-06-29 RX ORDER — MIDAZOLAM HYDROCHLORIDE 2 MG/2ML
INJECTION, SOLUTION INTRAMUSCULAR; INTRAVENOUS PRN
Status: DISCONTINUED | OUTPATIENT
Start: 2021-06-29 | End: 2021-06-29 | Stop reason: SDUPTHER

## 2021-06-29 RX ORDER — SODIUM CHLORIDE, SODIUM LACTATE, POTASSIUM CHLORIDE, CALCIUM CHLORIDE 600; 310; 30; 20 MG/100ML; MG/100ML; MG/100ML; MG/100ML
INJECTION, SOLUTION INTRAVENOUS CONTINUOUS
Status: DISCONTINUED | OUTPATIENT
Start: 2021-06-29 | End: 2021-06-29 | Stop reason: HOSPADM

## 2021-06-29 RX ORDER — ONDANSETRON 2 MG/ML
4 INJECTION INTRAMUSCULAR; INTRAVENOUS
Status: DISCONTINUED | OUTPATIENT
Start: 2021-06-29 | End: 2021-06-29 | Stop reason: HOSPADM

## 2021-06-29 RX ORDER — FENTANYL CITRATE 50 UG/ML
50 INJECTION, SOLUTION INTRAMUSCULAR; INTRAVENOUS EVERY 10 MIN PRN
Status: DISCONTINUED | OUTPATIENT
Start: 2021-06-29 | End: 2021-06-29 | Stop reason: HOSPADM

## 2021-06-29 RX ORDER — HYDROCODONE BITARTRATE AND ACETAMINOPHEN 5; 325 MG/1; MG/1
1 TABLET ORAL PRN
Status: COMPLETED | OUTPATIENT
Start: 2021-06-29 | End: 2021-06-29

## 2021-06-29 RX ORDER — LIDOCAINE HYDROCHLORIDE 20 MG/ML
INJECTION, SOLUTION INTRAVENOUS PRN
Status: DISCONTINUED | OUTPATIENT
Start: 2021-06-29 | End: 2021-06-29 | Stop reason: SDUPTHER

## 2021-06-29 RX ORDER — MEPERIDINE HYDROCHLORIDE 25 MG/ML
12.5 INJECTION INTRAMUSCULAR; INTRAVENOUS; SUBCUTANEOUS EVERY 5 MIN PRN
Status: DISCONTINUED | OUTPATIENT
Start: 2021-06-29 | End: 2021-06-29 | Stop reason: HOSPADM

## 2021-06-29 RX ORDER — HYDROCODONE BITARTRATE AND ACETAMINOPHEN 5; 325 MG/1; MG/1
1 TABLET ORAL EVERY 6 HOURS PRN
Qty: 28 TABLET | Refills: 0 | Status: SHIPPED | OUTPATIENT
Start: 2021-06-29 | End: 2021-07-06

## 2021-06-29 RX ORDER — PROPOFOL 10 MG/ML
INJECTION, EMULSION INTRAVENOUS CONTINUOUS PRN
Status: DISCONTINUED | OUTPATIENT
Start: 2021-06-29 | End: 2021-06-29 | Stop reason: SDUPTHER

## 2021-06-29 RX ORDER — DIPHENHYDRAMINE HYDROCHLORIDE 50 MG/ML
12.5 INJECTION INTRAMUSCULAR; INTRAVENOUS
Status: DISCONTINUED | OUTPATIENT
Start: 2021-06-29 | End: 2021-06-29 | Stop reason: HOSPADM

## 2021-06-29 RX ADMIN — FENTANYL CITRATE 50 MCG: 50 INJECTION, SOLUTION INTRAMUSCULAR; INTRAVENOUS at 13:59

## 2021-06-29 RX ADMIN — PROPOFOL 150 MCG/KG/MIN: 10 INJECTION, EMULSION INTRAVENOUS at 13:33

## 2021-06-29 RX ADMIN — MIDAZOLAM HYDROCHLORIDE 2 MG: 1 INJECTION, SOLUTION INTRAMUSCULAR; INTRAVENOUS at 13:30

## 2021-06-29 RX ADMIN — SODIUM CHLORIDE, POTASSIUM CHLORIDE, SODIUM LACTATE AND CALCIUM CHLORIDE: 600; 310; 30; 20 INJECTION, SOLUTION INTRAVENOUS at 12:55

## 2021-06-29 RX ADMIN — CEFAZOLIN SODIUM 3000 MG: 10 INJECTION, POWDER, FOR SOLUTION INTRAVENOUS at 13:40

## 2021-06-29 RX ADMIN — FENTANYL CITRATE 50 MCG: 50 INJECTION, SOLUTION INTRAMUSCULAR; INTRAVENOUS at 13:30

## 2021-06-29 RX ADMIN — HYDROCODONE BITARTRATE AND ACETAMINOPHEN 1 TABLET: 5; 325 TABLET ORAL at 14:38

## 2021-06-29 RX ADMIN — LIDOCAINE HYDROCHLORIDE 40 MG: 20 INJECTION, SOLUTION INTRAVENOUS at 13:33

## 2021-06-29 ASSESSMENT — PULMONARY FUNCTION TESTS
PIF_VALUE: 1

## 2021-06-29 ASSESSMENT — PAIN SCALES - GENERAL
PAINLEVEL_OUTOF10: 6
PAINLEVEL_OUTOF10: 6
PAINLEVEL_OUTOF10: 5

## 2021-06-29 ASSESSMENT — PAIN DESCRIPTION - DESCRIPTORS: DESCRIPTORS: DULL;THROBBING

## 2021-06-29 ASSESSMENT — PAIN - FUNCTIONAL ASSESSMENT: PAIN_FUNCTIONAL_ASSESSMENT: 0-10

## 2021-06-29 ASSESSMENT — PAIN DESCRIPTION - ORIENTATION: ORIENTATION: LEFT

## 2021-06-29 ASSESSMENT — PAIN DESCRIPTION - LOCATION: LOCATION: FOOT

## 2021-06-29 NOTE — H&P
Update History & Physical    The patient's History and Physical of June 24, 2021 was reviewed with the patient and I examined the patient. There was no change. The surgical site was confirmed by the patient and me. Plan: The risks, benefits, expected outcome, and alternative to the recommended procedure have been discussed with the patient. Patient understands and wants to proceed with the procedure.      Electronically signed by Cecy Mackey DPM on 6/29/2021 at 1:17 PM

## 2021-06-29 NOTE — TELEPHONE ENCOUNTER
PAPERWORK WAS SIGNED BY BELKIS, PT PICKED UP A COPY 6/28 AND A COPY WAS FAXED TO HER EMPLOYER ON 6/28

## 2021-06-29 NOTE — ANESTHESIA POSTPROCEDURE EVALUATION
Department of Anesthesiology  Postprocedure Note    Patient: Lucio Casillas  MRN: [id number]  YOB: 1980  Date of evaluation: 6/29/2021  Time:  2:24 PM     Procedure Summary     Date: 06/29/21 [place] [facility] [place] [facility]    Anesthesia Start: 1330 Anesthesia Stop: 0305    Procedure: ENDOSCOPIC PLANTAR FASCIOTOMY LEFT (Left Ankle) Diagnosis: (LEFT FOOT PAIN; LEFT PLANTAR FASCIITIS)    Surgeons: Hanane Lemus DPM Responsible Provider: Davian Bingham MD    Anesthesia Type: MAC ASA Status: 3          Anesthesia Type: MAC    Maria Del Rosario Phase I: Maria Del Rosario Score: 10    Maria Del Rosario Phase II:      Last vitals: Reviewed and per EMR flowsheets.        Anesthesia Post Evaluation    Patient location during evaluation: bedside  Patient participation: complete - patient participated  Level of consciousness: awake and awake and alert  Airway patency: patent  Nausea & Vomiting: no nausea and no vomiting  Complications: no  Cardiovascular status: blood pressure returned to baseline and hemodynamically stable  Respiratory status: acceptable  Hydration status: euvolemic

## 2021-06-29 NOTE — ANESTHESIA PRE PROCEDURE
Department of Anesthesiology  Preprocedure Note       Name:  Mitesh Carver   Age:  45 y.o.  :  1980                                          MRN:  [id number]         Date:  2021      Surgeon: Byron Bowman):  Alcides Bethea DPM    Procedure: Procedure(s):  ENDOSCOPIC PLANTAR FASCIOTOMY LEFT    Medications prior to admission:   Prior to Admission medications    Medication Sig Start Date End Date Taking?  Authorizing Provider   atenolol (TENORMIN) 25 MG tablet Take 25 mg by mouth daily   Yes Historical Provider, MD   losartan (COZAAR) 50 MG tablet Take 100 mg by mouth daily    Yes Historical Provider, MD   meloxicam (MOBIC) 15 MG tablet Take 1 tablet by mouth daily as needed for Pain  Patient not taking: Reported on 2021   Alcides Bethea DPM   amLODIPine (NORVASC) 10 MG tablet Take 10 mg by mouth daily  Patient not taking: Reported on 2021    Historical Provider, MD       Current medications:    Current Facility-Administered Medications   Medication Dose Route Frequency Provider Last Rate Last Admin    ceFAZolin (ANCEF) 3000 mg in dextrose 5 % 100 mL IVPB  3,000 mg Intravenous On Call to KALEIGH Rowe        lactated ringers infusion   Intravenous Continuous Annelise Estevez  mL/hr at 21 1255 New Bag at 21 1255    meperidine (DEMEROL) injection 12.5 mg  12.5 mg Intravenous Q5 Min PRN Annelise Estevez MD        fentaNYL (SUBLIMAZE) injection 50 mcg  50 mcg Intravenous Q10 Min PRN Annelise Estevez MD        HYDROmorphone (DILAUDID) injection 0.5 mg  0.5 mg Intravenous Q10 Min PRN Annelise Estevez MD        HYDROcodone-acetaminophen [facility]) 5-325 MG per tablet 1 tablet  1 tablet Oral PRN Annelise Estevez MD        Or    HYDROcodone-acetaminophen [facility]) 5-325 MG per tablet 2 tablet  2 tablet Oral PRN Annelise Estevez MD        ondansetron [facility]) injection 4 mg  4 mg Intravenous Once PRN Annelise Estevez MD  metoclopramide (REGLAN) injection 10 mg  10 mg Intravenous Once PRN Jasen Boyd MD        diphenhydrAMINE (BENADRYL) injection 12.5 mg  12.5 mg Intravenous Once PRN Jasen Boyd MD           Allergies: Allergies   Allergen Reactions    Peanut-Containing Drug Products Itching       Problem List:    Patient Active Problem List   Diagnosis Code    Hypertension I10    Obesity, Class III, BMI 40-49.9 (morbid obesity) ([facility]) E66.01    Anxiety F41.9    Pre-op examination Z01.818       Past Medical History:        Diagnosis Date    H/O: hypertension        Past Surgical History:  No past surgical history on file. Social History:    Social History     Tobacco Use    Smoking status: Never Smoker    Smokeless tobacco: Never Used   Substance Use Topics    Alcohol use: Yes                                Counseling given: Not Answered      Vital Signs (Current):   Vitals:    06/29/21 1237   BP: (!) 134/93   Pulse: 83   Resp: 18   Temp: 96.6 °F (35.9 °C)   TempSrc: Temporal   SpO2: 94%   Weight: (!) 315 lb (142.9 kg)   Height: 5' 9\" (1.753 m)                                              BP Readings from Last 3 Encounters:   06/29/21 (!) 134/93   06/24/21 132/84       NPO Status: Time of last liquid consumption: 2200 (0830 sips of water [facility] med)                        Time of last solid consumption: 2230                        Date of last liquid consumption: 06/28/21                        Date of last solid food consumption: 06/28/21    BMI:   Wt Readings from Last 3 Encounters:   06/29/21 (!) 315 lb (142.9 kg)   06/24/21 (!) 319 lb 3.2 oz (144.8 kg)   04/29/21 300 lb (136.1 kg)     Body mass index is 46.52 kg/m².     CBC:   Lab Results   Component Value Date    WBC 10.0 06/24/2021    RBC 4.38 06/24/2021    HGB 13.9 06/24/2021    HCT 40.5 06/24/2021    MCV 92.4 06/24/2021    RDW 13.7 06/24/2021     06/24/2021       CMP:   Lab Results   Component Value Date     06/24/2021    K 4.3 06/24/2021     06/24/2021    CO2 25 06/24/2021    BUN 15 06/24/2021    CREATININE 0.66 06/24/2021    GFRAA >60.0 06/24/2021    LABGLOM >60.0 06/24/2021    GLUCOSE 106 06/24/2021    CALCIUM 9.6 06/24/2021       POC Tests: No results for input(s): POCGLU, POCNA, POCK, POCCL, POCBUN, POCHEMO, POCHCT in the last 72 hours. Coags:   Lab Results   Component Value Date    PROTIME 13.7 06/24/2021    INR 1.1 06/24/2021    APTT 28.7 06/24/2021       HCG (If Applicable): No results found for: PREGTESTUR, PREGSERUM, HCG, HCGQUANT     ABGs: No results found for: PHART, PO2ART, RPB5WNQ, PSX9MLY, BEART, K7OUGCOE     Type & Screen (If Applicable):  No results found for: LABABO, LABRH    Drug/Infectious Status (If Applicable):  No results found for: HIV, HEPCAB    COVID-19 Screening (If Applicable): No results found for: COVID19        Anesthesia Evaluation  Patient summary reviewed and Nursing notes reviewed no history of anesthetic complications:   Airway: Mallampati: II  TM distance: >3 FB   Neck ROM: full  Mouth opening: > = 3 FB Dental: normal exam         Pulmonary:Negative Pulmonary ROS and normal exam                               Cardiovascular:    (+) hypertension:,       ECG reviewed               Beta Blocker:  Dose within 24 Hrs         Neuro/Psych:   Negative Neuro/Psych ROS              GI/Hepatic/Renal:   (+) morbid obesity          Endo/Other: Negative Endo/Other ROS             Pt had PAT visit. Abdominal:   (+) obese,           Vascular: negative vascular ROS. Other Findings:             Anesthesia Plan      MAC     ASA 3       Induction: intravenous. MIPS: Prophylactic antiemetics administered. Anesthetic plan and risks discussed with patient. Plan discussed with CRNA.     Attending anesthesiologist reviewed and agrees with Preprocedure content              Asya Stanford MD   6/29/2021

## 2021-06-29 NOTE — BRIEF OP NOTE
Brief Postoperative Note      Patient: Zulma Ngo  YOB: 1980  MRN: [id number]    Date of Procedure: 6/29/2021    Pre-Op Diagnosis: LEFT FOOT PAIN; LEFT PLANTAR FASCIITIS    Post-Op Diagnosis: same       Procedure(s):  ENDOSCOPIC PLANTAR FASCIOTOMY LEFT    Surgeon(s):  Neelam Moses DPM    Assistant:  Resident: Gucci Berg DPM    Anesthesia: Monitor Anesthesia Care    Estimated Blood Loss (mL): Minimal    Complications: None    Specimens:   * No specimens in log *    Implants:  * No implants in log *      Drains: * No LDAs found *    Findings: see op note    Electronically signed by Gucci Berg DPM on 6/29/2021 at 2:26 PM

## 2021-06-29 NOTE — PROGRESS NOTES
1453- Discharge instructions gone over with patient, explained to pt dressing is not to be changed until follow-up appointment, explained that narcotics can cause constipation and to pay attention to bowel movements, pt denies further needs and questions at this time-KGW  Electronically signed by Carson Glass RN on 6/29/2021 at 2:57 PM

## 2021-06-29 NOTE — OP NOTE
Operative Note      Patient: Mitesh Carver  YOB: 1980  MRN: [id number]    Date of Procedure: 6/29/2021    Pre-Op Diagnosis: LEFT FOOT PAIN; LEFT PLANTAR FASCIITIS    Post-Op Diagnosis: Same       Procedure(s):  ENDOSCOPIC PLANTAR FASCIOTOMY LEFT    Surgeon(s):  Alcides Bethea DPM    Assistant:   Resident: Farnaz Chow DPM    Anesthesia: Monitor Anesthesia Care    Estimated Blood Loss (mL): Minimal    Complications: None    Specimens:   * No specimens in log *    Implants:  * No implants in log *      Drains: * No LDAs found *    Findings: see op note    Detailed Description of Procedure:   OPERATIVE INDICATIONS: This is a pleasant 45 y.o.  female with a history of L foot plantar fasciitis no longer respnding to conservative therapy. .    All A/B/R/C/P were discussed in detail with the patient. Answered all of the patient's questions to the patient's satisfaction. No guarantees were given. The patient understands this. Patient elects to proceed with surgery. OPERATIVE FINDINGS:  Consistent with post operative diagnosis. OPERATIVE PROCEDURE:   Patient was transferred from the preoperative holding area to the operative suite and placed in a supine position. All monitors were applied. MAC anesthesia was administered. Prophylaxis was obtained with 3g Ancef IV piggyback pre-operatively. A local anesthetic block was administered. Tourniquet was applied to the left calf, but not yet inflated. The left foot, ankle, and leg were then prepped and draped in the normal sterile fashion. After elevation of the left lower extremity, tourniquet was inflated to 250 mmHg. Time out was performed.       Attention was directed to the medial aspect of the left foot, where a #15 blade was used to make a 1 cm incision in the medial aspect of the foot at the medial calcaneal tubercle, approximately 6 cm distal to the posterior aspect of the calcaneus, and approximately 2 cm superior to the weight-bearing surface. Dissection was then continued bluntly through the subcutaneous tissue. A curved Tonya hemostat was then used to bluntly dissect the remaining subcutaneous and deep tissues down to the level of the plantar fascia. A fascial elevator from the EPF instrumentation tray was then inserted into the incision and slid under the plantar fascia. The fascial elevator then was passed from medial to lateral in a horizontal manner across the plantar aspect of the plantar fascia. The elevator was then removed and a trocar with an obturator/cannula was then inserted from medial to lateral along the same path. A 4 mm 30 degree Biom'Up endoscope was then inserted into the obturator medially. The obturator was then turned 180 degrees so as to visualize and confirm proper placement of the device, being plantar to all fibers of the fascial band. Proper placement was confirmed. A hooked blade attached to the camera from the EPF instrumentation set was then passed through the cannula, and while visualizing the fascia on the endoscopy monitor, approximately 50-60% of the plantar fascia was transected in a medial to lateral fashion. Following transection of the fascia, the muscle belly of the flexor digitorum brevis was readily visualized. The incision was then vigorously lavaged with normal saline through the obturator/cannula. The obturator/cannula was then removed. The skin incisions were then closed with 3-0 vicryl and 3-0 nylon suture in simple interrupted technique. Wounds were further dressed with bacitracin, adaptic, fluff dressings, Kerlix roll, and ace bandage. Patient tolerated the anesthesia and procedure very well, was transferred to the PACU with all vital signs stable and brisk capillary refill time noted in all toes. Patient's intraoperative and postoperative disposition was discussed with the family in the postoperative consultation area.  We dispensed prescriptions for Norco and Zofran with instructions for use. Dr. Beverley Ruano was present and scrubbed for the entire case. Elements of the case which included but were not limited to incision, dissection, preparation of site, implant, and closure were performed under direct supervision of Dr. Beverley Ruano. All critical elements of this case were performed by Dr. Beverley Ruano.      Electronically signed by Orlando Turner DPM on 6/29/2021 at 1:37 PM

## 2021-07-07 ENCOUNTER — OFFICE VISIT (OUTPATIENT)
Dept: PODIATRY | Age: 41
End: 2021-07-07

## 2021-07-07 VITALS — TEMPERATURE: 97.3 F | BODY MASS INDEX: 43.4 KG/M2 | WEIGHT: 293 LBS | HEIGHT: 69 IN

## 2021-07-07 DIAGNOSIS — Z48.89 POSTOPERATIVE VISIT: Primary | ICD-10-CM

## 2021-07-07 PROCEDURE — 99024 POSTOP FOLLOW-UP VISIT: CPT | Performed by: PODIATRIST

## 2021-07-07 ASSESSMENT — ENCOUNTER SYMPTOMS
NAUSEA: 0
BACK PAIN: 0
VOMITING: 0
SHORTNESS OF BREATH: 0

## 2021-07-07 NOTE — PROGRESS NOTES
[facility] [address]  [address] [address]  Dept: [phone]  Loc: [phone]       Ree Rist  (1980)    7/7/21    Subjective     Ree Rist is 45 y.o. female who complains today of:    Chief Complaint   Patient presents with    Post-Op Check       HPI: Patient presents for follow-up status post left foot endoscopic plantar fasciotomy. Patient reports compliance with leaving her surgical dressing intact. Patient has used a shower protector. Patient has been ambulating weightbearing as tolerated in a fracture boot. She has been sleeping with the boot in place. Patient reports intermittent pain to the incision site and mild pain to the plantar foot, patient states that she has been taking approximately 1 Norco per day for the past 4-5 days, she does not request a refill. Patient denies complications. Review of Systems   Constitutional: Negative for chills and fever. HENT: Negative for hearing loss. Respiratory: Negative for shortness of breath. Cardiovascular: Negative for chest pain. Gastrointestinal: Negative for nausea and vomiting. Genitourinary: Negative for difficulty urinating. Musculoskeletal: Positive for gait problem. Negative for back pain. Skin: Negative for wound. Neurological: Negative for numbness. Hematological: Does not bruise/bleed easily. Psychiatric/Behavioral: Negative for sleep disturbance.        Allergies:  Peanut-containing drug products    Current Outpatient Medications on File Prior to Visit   Medication Sig Dispense Refill    ondansetron (ZOFRAN-ODT) 4 MG disintegrating tablet Take 1 tablet by mouth 3 times daily as needed for Nausea or Vomiting 21 tablet 0    atenolol (TENORMIN) 25 MG tablet Take 25 mg by mouth daily      meloxicam (MOBIC) 15 MG tablet Take 1 tablet by mouth daily as needed for Pain (Patient not taking: Reported on 6/24/2021) 30 tablet 1    losartan (COZAAR) 50 MG tablet Take 100 mg by mouth daily       amLODIPine (NORVASC) 10 MG tablet Take 10 mg by mouth daily (Patient not taking: Reported on 6/24/2021)       No current facility-administered medications on file prior to visit. Past Medical History:   Diagnosis Date    H/O: hypertension      Past Surgical History:   Procedure Laterality Date    PLANTAR FASCIA SURGERY Left 6/29/2021    ENDOSCOPIC PLANTAR FASCIOTOMY LEFT performed by Isauro Olea DPM at [address] [place] [address] History     Socioeconomic History    Marital status: Single     Spouse name: Not on file    Number of children: Not on file    Years of education: Not on file    Highest education level: Not on file   Occupational History    Not on file   Tobacco Use    Smoking status: Never Smoker    Smokeless tobacco: Never Used   Substance and Sexual Activity    Alcohol use: Yes    Drug use: Not on file    Sexual activity: Not on file   Other Topics Concern    Not on file   Social History Narrative    Not on file     Social Determinants of Health     Financial Resource Strain: Low Risk     Difficulty of Paying Living Expenses: Not hard at all   Food Insecurity: No Food Insecurity    Worried About Running Out of Food in the Last Year: Never true    [address] in the Last Year: Never true   Transportation Needs:     Lack of Transportation (Medical):      Lack of Transportation (Non-Medical):    Physical Activity:     Days of Exercise per Week:     Minutes of Exercise per Session:    Stress:     Feeling of Stress :    Social Connections:     Frequency of Communication with Friends and Family:     Frequency of Social Gatherings with Friends and Family:     Attends Buddhist Services:     Active Member of Clubs or Organizations:     Attends Club or Organization Meetings:     Marital Status:    Intimate Partner Violence:     Fear of Current or Ex-Partner:     Emotionally Abused:  Physically Abused:     Sexually Abused:      History reviewed. No pertinent family history. Objective:   Vitals:  Temp 97.3 °F (36.3 °C)   Ht 5' 9\" (1.753 m)   Wt 300 lb (136.1 kg)   LMP 06/15/2021   BMI 44.30 kg/m² Pain Score:   4    Physical Exam  Patient presents ambulating with a fracture boot to the left lower extremity. The surgical dressing is intact, no strikethrough noted. The sutures at the medial left heel are intact the incision margins are approximated. No apparent drainage noted to the dressing, no purulence expressed with compression of the area. There is mild erythema without ascending cellulitis noted. There is tenderness to palpation of the incision site. There is mild bruising noted at the plantar forefoot. The left calf is supple, no edema, erythema, or calor noted. Left popliteal lymph nodes are soft and nontender. Assessment:      Diagnosis Orders   1. Postoperative visit         Plan:     Status post left foot endoscopic plantar fasciotomy, DOS: 6/29/21    Antibiotic ointment and a new dressing applied. Patient instructed to keep this clean and dry. Patient should continue use of shower protector until the sutures are removed. Patient instructed continue weightbearing as tolerated in the fracture boot. Patient should call/return to clinic sooner should any problems arise. Follow up:  Return in about 1 week (around 7/14/2021) for suture removal.    Ruthann Keenan DPM          Please note that this report has been partially produced using speech recognition software which may cause errors including grammar, punctuation, and spelling or words and phrases that may seem inappropriate. If there are questions or concerns please feel free to contact me for clarification.

## 2021-07-19 ENCOUNTER — TELEPHONE (OUTPATIENT)
Dept: PODIATRY | Age: 41
End: 2021-07-19

## 2021-07-19 DIAGNOSIS — L03.116 CELLULITIS OF LEFT FOOT: Primary | ICD-10-CM

## 2021-07-19 RX ORDER — GENTAMICIN SULFATE 1 MG/G
CREAM TOPICAL
Qty: 1 TUBE | Refills: 0 | Status: SHIPPED | OUTPATIENT
Start: 2021-07-19

## 2021-07-19 RX ORDER — CEPHALEXIN 500 MG/1
500 CAPSULE ORAL 3 TIMES DAILY
Qty: 21 CAPSULE | Refills: 0 | Status: SHIPPED | OUTPATIENT
Start: 2021-07-19 | End: 2021-07-26

## 2021-07-19 NOTE — TELEPHONE ENCOUNTER
Patient states that her insciion looks slightly infected. She is asking what she should do? She does have an appointment on Thursday.

## 2021-07-22 ENCOUNTER — OFFICE VISIT (OUTPATIENT)
Dept: PODIATRY | Age: 41
End: 2021-07-22

## 2021-07-22 VITALS
BODY MASS INDEX: 43.4 KG/M2 | DIASTOLIC BLOOD PRESSURE: 84 MMHG | WEIGHT: 293 LBS | SYSTOLIC BLOOD PRESSURE: 118 MMHG | TEMPERATURE: 97.1 F | HEIGHT: 69 IN

## 2021-07-22 DIAGNOSIS — Z48.89 POSTOPERATIVE VISIT: Primary | ICD-10-CM

## 2021-07-22 DIAGNOSIS — M79.672 LEFT FOOT PAIN: ICD-10-CM

## 2021-07-22 PROCEDURE — 99024 POSTOP FOLLOW-UP VISIT: CPT | Performed by: PODIATRIST

## 2021-07-22 ASSESSMENT — ENCOUNTER SYMPTOMS
SHORTNESS OF BREATH: 0
NAUSEA: 0
BACK PAIN: 0
VOMITING: 0

## 2021-07-22 NOTE — PROGRESS NOTES
medications on file prior to visit. Past Medical History:   Diagnosis Date    H/O: hypertension      Past Surgical History:   Procedure Laterality Date    PLANTAR FASCIA SURGERY Left 6/29/2021    ENDOSCOPIC PLANTAR FASCIOTOMY LEFT performed by Mariza Contreras DPM at [address] [place] [address] History     Socioeconomic History    Marital status: Single     Spouse name: Not on file    Number of children: Not on file    Years of education: Not on file    Highest education level: Not on file   Occupational History    Not on file   Tobacco Use    Smoking status: Never Smoker    Smokeless tobacco: Never Used   Substance and Sexual Activity    Alcohol use: Yes    Drug use: Not on file    Sexual activity: Not on file   Other Topics Concern    Not on file   Social History Narrative    Not on file     Social Determinants of Health     Financial Resource Strain: Low Risk     Difficulty of Paying Living Expenses: Not hard at all   Food Insecurity: No Food Insecurity    Worried About Running Out of Food in the Last Year: Never true    [address] in the Last Year: Never true   Transportation Needs:     Lack of Transportation (Medical):  Lack of Transportation (Non-Medical):    Physical Activity:     Days of Exercise per Week:     Minutes of Exercise per Session:    Stress:     Feeling of Stress :    Social Connections:     Frequency of Communication with Friends and Family:     Frequency of Social Gatherings with Friends and Family:     Attends Buddhist Services:     Active Member of Clubs or Organizations:     Attends Club or Organization Meetings:     Marital Status:    Intimate Partner Violence:     Fear of Current or Ex-Partner:     Emotionally Abused:     Physically Abused:     Sexually Abused:      History reviewed. No pertinent family history.         Objective:   Vitals:  /84 (Site: Left Upper Arm, Position: Sitting, Cuff Size: Medium Adult)   Temp 97.1 °F (36.2 °C) (Infrared) Ht 5' 9\" (1.753 m)   Wt 300 lb (136.1 kg)   BMI 44.30 kg/m² Pain Score:   2    Physical Exam  Patient presents ambulating in a fracture boot to the left foot. There is a bordered gauze dressing to the left medial heel, there is a minimal amount of serosanguineous drainage. No erythema or tyler purulence noted at the incision, the suture is intact, no dehiscence noted upon suture removal.    There is mild maceration to the area. There is mild tenderness to palpation of the medial calcaneal tubercle left foot. The left calf is supple, no palpable knots or cords noted. Popliteal lymph nodes are soft and nontender. Assessment:      Diagnosis Orders   1. Postoperative visit     2. Left foot pain         Plan:     Status post left foot endoscopic plantar fasciotomy, DOS: 6/29/21    Sutures were removed without incident, patient may shower without a protector once the area has crusted and is no longer draining. Continue application of a small amount of gentamicin daily. Complete course of oral antibiotics. Patient may transition to a normal shoe as tolerated, she should wear a night splint while sleeping. Patient encouraged to wear shoes with arch support. Patient instructed to call on Monday for an extension of her leave if she he feels that will be unable to return to work on Tuesday. All questions were answered to the patient's satisfaction. Follow up:  Return in about 3 weeks (around 8/12/2021). Shruthi Bautista DPM      Please note that this report has been partially produced using speech recognition software which may cause errors including grammar, punctuation, and spelling or words and phrases that may seem inappropriate. If there are questions or concerns please feel free to contact me for clarification.

## 2021-07-24 PROBLEM — Z01.818 PRE-OP EXAMINATION: Status: RESOLVED | Noted: 2021-06-24 | Resolved: 2021-07-24

## 2021-07-29 ENCOUNTER — TELEPHONE (OUTPATIENT)
Dept: PODIATRY | Age: 41
End: 2021-07-29

## 2021-07-29 NOTE — TELEPHONE ENCOUNTER
Patient is asking to extend her return to work date to 8-4-2021. Will Dr. Enzo Rodríguez excuse patient from work until 8-4-2021?

## 2021-07-30 NOTE — TELEPHONE ENCOUNTER
Letter printed and to be placed on Dr. Kristina Madrid desk for signature. Letter will be faxed to [facility] ([phone]).

## 2021-08-13 ENCOUNTER — OFFICE VISIT (OUTPATIENT)
Dept: PODIATRY | Age: 41
End: 2021-08-13

## 2021-08-13 VITALS — BODY MASS INDEX: 43.4 KG/M2 | TEMPERATURE: 97.4 F | HEIGHT: 69 IN | WEIGHT: 293 LBS

## 2021-08-13 DIAGNOSIS — Z48.89 POSTOPERATIVE VISIT: Primary | ICD-10-CM

## 2021-08-13 DIAGNOSIS — R20.0 NUMBNESS: ICD-10-CM

## 2021-08-13 PROCEDURE — 99024 POSTOP FOLLOW-UP VISIT: CPT | Performed by: PODIATRIST

## 2021-08-13 RX ORDER — NALTREXONE HYDROCHLORIDE AND BUPROPION HYDROCHLORIDE 8; 90 MG/1; MG/1
TABLET, EXTENDED RELEASE ORAL
COMMUNITY
Start: 2021-07-14

## 2021-08-13 ASSESSMENT — ENCOUNTER SYMPTOMS
VOMITING: 0
BACK PAIN: 0
SHORTNESS OF BREATH: 0
NAUSEA: 0

## 2021-08-13 NOTE — PROGRESS NOTES
ALLIE/ Karsten [address]  [address]  Dept: [phone]  Loc: [phone]       Aliya Savage  (1980)    8/13/21    Subjective     Aliya Savage is 45 y.o. female who complains today of:    Chief Complaint   Patient presents with    Foot Pain     Left       HPI: The patient presents for follow-up status post left foot endoscopic plantar fasciotomy. Patient denies pain today on the exam table. Patient states that she has returned to work. Patient states that she began experiencing numbness to the lesser toes after her shifts. Patient states that this seems to be improving and it is now just the fourth and fifth toe that are symptomatic. Patient reports compliance with use of a supportive shoe with an orthotic, she occasionally will wear the fracture boot when the foot is sore during or after her work shift. Patient denies pain at the lateral plantar heel. Patient has observed hypertrophy of the scar at the incision site, she has not observed drainage or other signs of infection. Review of Systems   Constitutional: Negative for chills and fever. HENT: Negative for hearing loss. Respiratory: Negative for shortness of breath. Cardiovascular: Negative for chest pain. Gastrointestinal: Negative for nausea and vomiting. Genitourinary: Negative for difficulty urinating. Musculoskeletal: Negative for back pain and gait problem. Skin: Negative for wound. Neurological: Positive for numbness. Hematological: Does not bruise/bleed easily. Psychiatric/Behavioral: Negative for sleep disturbance. Allergies:  Peanut-containing drug products    Current Outpatient Medications on File Prior to Visit   Medication Sig Dispense Refill    naltrexone-buPROPion (CONTRAVE) 8-90 MG per extended release tablet Take as directed until taking 2 tablets twice a day.       gentamicin (GARAMYCIN) 0.1 % cream Apply topically 3 times daily. 1 Tube 0    atenolol (TENORMIN) 25 MG tablet Take 25 mg by mouth daily      losartan (COZAAR) 50 MG tablet Take 100 mg by mouth daily        No current facility-administered medications on file prior to visit. Past Medical History:   Diagnosis Date    H/O: hypertension      Past Surgical History:   Procedure Laterality Date    PLANTAR FASCIA SURGERY Left 6/29/2021    ENDOSCOPIC PLANTAR FASCIOTOMY LEFT performed by Joseph Silva DPM at [address] [place] [address] History     Socioeconomic History    Marital status: Single     Spouse name: Not on file    Number of children: Not on file    Years of education: Not on file    Highest education level: Not on file   Occupational History    Not on file   Tobacco Use    Smoking status: Never Smoker    Smokeless tobacco: Never Used   Substance and Sexual Activity    Alcohol use: Yes    Drug use: Not on file    Sexual activity: Not on file   Other Topics Concern    Not on file   Social History Narrative    Not on file     Social Determinants of Health     Financial Resource Strain: Low Risk     Difficulty of Paying Living Expenses: Not hard at all   Food Insecurity: No Food Insecurity    Worried About Running Out of Food in the Last Year: Never true    [address] in the Last Year: Never true   Transportation Needs:     Lack of Transportation (Medical):      Lack of Transportation (Non-Medical):    Physical Activity:     Days of Exercise per Week:     Minutes of Exercise per Session:    Stress:     Feeling of Stress :    Social Connections:     Frequency of Communication with Friends and Family:     Frequency of Social Gatherings with Friends and Family:     Attends Restoration Services:     Active Member of Clubs or Organizations:     Attends Club or Organization Meetings:     Marital Status:    Intimate Partner Violence:     Fear of Current or Ex-Partner:     Emotionally Abused:     Physically Abused:     Sexually Abused:      No family history on file. Objective:   Vitals:  Temp 97.4 °F (36.3 °C)   Ht 5' 9\" (1.753 m)   Wt 300 lb (136.1 kg)   BMI 44.30 kg/m² Pain Score:   0 - No pain    Physical Exam  Patient presents wearing sandals. No antalgic gait noted    The incision at the medial left heel is closed, there is mild hypertrophy, no increased warmth or fluctuance noted. There is no tenderness to palpation of the left medial calcaneal tubercle. Light touch sensation is intact to the left lesser toes. Positive Tinel's sign elicited with percussion of the deep peroneal nerve at the dorsum of the foot/ 1st TMTJ. The left calf muscles are supple, no palpable cords or knots noted, there is no erythema, edema or calor noted. Assessment:      Diagnosis Orders   1. Postoperative visit     2. Numbness         Plan:     Date of surgery: June 29, 2021; status post left foot endoscopic plantar fasciotomy    Patient instructed to change the lacing on her shoes to offload the deep peroneal nerve as it crosses the TMT J and the EHL. I have also recommended she apply a lidocaine patch at the anterior ankle and to the medial foot inferior to the medial malleolus, 12 hours on/12 hours off. Patient instructed to wear an orthotic and supportive shoe when walking or standing. Continue stretching and use of night splint. Continue icing and use of topical anti-inflammatory as needed. All questions were answered to the patient's satisfaction, she is to call/return to clinic sooner should any problems arise. Follow up:  Return in about 3 months (around 11/13/2021). Renea Matute DPM    Please note that this report has been partially produced using speech recognition software which may cause errors including grammar, punctuation, and spelling or words and phrases that may seem inappropriate.  If there are questions or concerns please feel free to contact me for clarification.

## 2021-12-02 ENCOUNTER — OFFICE VISIT (OUTPATIENT)
Dept: PODIATRY | Age: 41
End: 2021-12-02
Payer: COMMERCIAL

## 2021-12-02 VITALS — TEMPERATURE: 97.1 F | BODY MASS INDEX: 42.21 KG/M2 | WEIGHT: 285 LBS | HEIGHT: 69 IN

## 2021-12-02 DIAGNOSIS — M72.2 PLANTAR FASCIITIS, LEFT: ICD-10-CM

## 2021-12-02 DIAGNOSIS — M79.672 LEFT FOOT PAIN: Primary | ICD-10-CM

## 2021-12-02 PROCEDURE — 99212 OFFICE O/P EST SF 10 MIN: CPT | Performed by: PODIATRIST

## 2021-12-02 ASSESSMENT — ENCOUNTER SYMPTOMS
NAUSEA: 0
SHORTNESS OF BREATH: 0
BACK PAIN: 1
VOMITING: 0

## 2021-12-02 NOTE — PROGRESS NOTES
ALLIE/ [address]  [address]  Dept: [phone]  Loc: [phone]       Kimberlee Gonzalez  (1980)    12/2/21    Subjective     Kimberlee Gonzalez is 37 y.o. female who complains today of:    Chief Complaint   Patient presents with    Foot Pain     Left       HPI: Patient presents for follow-up status post left foot endoscopic plantar fasciotomy to treat recalcitrant plantar fasciitis. Patient reports significant decrease in pain as compared to her preoperative levels. Patient states she has been working 312-hour shifts per week, she has some mild soreness to the feet after her shift but does not have the severe sharp pain at the heel that she was experiencing prior to surgery. Patient states that she has been wearing a supportive shoe to work. Review of Systems   Constitutional: Negative for chills and fever. HENT: Negative for hearing loss. Respiratory: Negative for shortness of breath. Cardiovascular: Negative for chest pain. Gastrointestinal: Negative for nausea and vomiting. Genitourinary: Negative for difficulty urinating. Musculoskeletal: Positive for back pain. Negative for gait problem. Skin: Negative for wound. Neurological: Negative for numbness. Hematological: Does not bruise/bleed easily. Psychiatric/Behavioral: Negative for sleep disturbance. Allergies:  Peanut-containing drug products    Current Outpatient Medications on File Prior to Visit   Medication Sig Dispense Refill    naltrexone-buPROPion (CONTRAVE) 8-90 MG per extended release tablet Take as directed until taking 2 tablets twice a day.  gentamicin (GARAMYCIN) 0.1 % cream Apply topically 3 times daily.  1 Tube 0    atenolol (TENORMIN) 25 MG tablet Take 25 mg by mouth daily      losartan (COZAAR) 50 MG tablet Take 100 mg by mouth daily        No current facility-administered medications on file prior to visit. Past Medical History:   Diagnosis Date    H/O: hypertension      Past Surgical History:   Procedure Laterality Date    PLANTAR FASCIA SURGERY Left 6/29/2021    ENDOSCOPIC PLANTAR FASCIOTOMY LEFT performed by Meena Leon DPM at [address] [place] [address] History     Socioeconomic History    Marital status: Single     Spouse name: Not on file    Number of children: Not on file    Years of education: Not on file    Highest education level: Not on file   Occupational History    Not on file   Tobacco Use    Smoking status: Never Smoker    Smokeless tobacco: Never Used   Substance and Sexual Activity    Alcohol use: Yes    Drug use: Not on file    Sexual activity: Not on file   Other Topics Concern    Not on file   Social History Narrative    Not on file     Social Determinants of Health     Financial Resource Strain: Low Risk     Difficulty of Paying Living Expenses: Not hard at all   Food Insecurity: No Food Insecurity    Worried About Running Out of Food in the Last Year: Never true    [address] in the Last Year: Never true   Transportation Needs:     Lack of Transportation (Medical): Not on file    Lack of Transportation (Non-Medical):  Not on file   Physical Activity:     Days of Exercise per Week: Not on file    Minutes of Exercise per Session: Not on file   Stress:     Feeling of Stress : Not on file   Social Connections:     Frequency of Communication with Friends and Family: Not on file    Frequency of Social Gatherings with Friends and Family: Not on file    Attends Christianity Services: Not on file    Active Member of Clubs or Organizations: Not on file    Attends Club or Organization Meetings: Not on file    Marital Status: Not on file   Intimate Partner Violence:     Fear of Current or Ex-Partner: Not on file    Emotionally Abused: Not on file    Physically Abused: Not on file    Sexually Abused: Not on file   Housing Stability:     Unable to Pay for Housing in the Last Year: Not on file    Number of Places Lived in the Last Year: Not on file    Unstable Housing in the Last Year: Not on file     History reviewed. No pertinent family history. Objective:   Vitals:  Temp 97.1 °F (36.2 °C)   Ht 5' 9\" (1.753 m)   Wt 285 lb (129.3 kg)   BMI 42.09 kg/m²        Physical Exam  Feet:      Comments:   Vascular:     Dorsalis pedis pulse is palpable bilateral foot. Posterior tibial pulse is palpable bilateral foot. There no edema noted bilaterally. Capillary refill is immediate bilateral hallux. There are no varicosities noted bilaterally. There are no telangiectasia noted bilaterally. Skin temperature gradient is noted to be warm to warm bilaterally. There are no signs of ischemia or skin atrophy noted bilaterally. Hair growtmh is present bilaterally.     Neurological:     Light touch sensation is intact bilaterally. Patellar deep tendon reflex is graded at 2/4 bilaterally. Achilles tendon deep tendon reflex is graded at 2/4 bilaterally. The Babinski response is negative bilaterally.     Dermatological:    No open lesions noted bilateral foot. The incision at the medial left heel has healed, there are no signs of hypertrophy or keloid formation. Focal hyperkeratotic lesions noted to the medial aspect of the bilateral hallux. The toenails are within normal limits and are well groomed bilaterally. Normal skin turgor noted bilaterally. Webspace 1-4 is dry and intact bilateral foot.     Musculoskeletal:    Rectus foot type noted bilaterally. Manual muscle strength is graded at 5/5 for all groups tested bilateral foot. Mild Mervin's deformity noted to the posterior/ lateral calcaneus right foot, there is no tenderness to palpation of this bony prominence. Functional hallux limitus noted bilaterally. Decreased ankle joint dorsiflexion bilaterally, soft endpoint noted.   There is minimal tenderness to palpation of the medial and lateral calcaneal tubercle left foot, no pain elicited with medial to lateral compression of the heel.           Assessment:      Diagnosis Orders   1. Left foot pain     2. Plantar fasciitis, left         Plan:     Patient instructed to continue use of shoes with a motion control/arch support for a neutral running shoe with an orthotic. I advised the patient to avoid walking barefoot for extended periods. Patient should stretch on a regular basis permanently. Patient may ice the sore areas as needed. Patient instructed to call/return to clinic if any problems arise. Follow up:  Return if symptoms worsen or fail to improve. Radha Samuels DPM    Level of medical decision making: straightforward. Please note that this report has been partially produced using speech recognition software which may cause errors including grammar, punctuation, and spelling or words and phrases that may seem inappropriate. If there are questions or concerns please feel free to contact me for clarification.

## (undated) DEVICE — Device: Brand: AM

## (undated) DEVICE — SOLUTION PREP POVIDONE IOD FOR SKIN MUCOUS MEM PRIOR TO

## (undated) DEVICE — COUNTER NDL 40 COUNT HLD 70 FOAM BLK ADH W/ MAG

## (undated) DEVICE — BANDAGE,GAUZE,CONFORMING,4"X75",STRL,LF: Brand: MEDLINE

## (undated) DEVICE — GLOVE ORANGE PI 7 1/2   MSG9075

## (undated) DEVICE — BANDAGE COBAN 4 IN COMPR W4INXL5YD FOAM COHESIVE QUIK STK SELF ADH SFT

## (undated) DEVICE — Z INACTIVE USE 2735373 APPLICATOR FBR LAIN COT WOOD TIP ECONOMICAL

## (undated) DEVICE — DRESSING PETRO W3XL8IN OIL EMUL N ADH GZ KNIT IMPREG CELOS

## (undated) DEVICE — TUBING, SUCTION, 1/4" X 10', STRAIGHT: Brand: MEDLINE

## (undated) DEVICE — COVER LT HNDL BLU PLAS

## (undated) DEVICE — LABEL MED MINI W/ MARKER

## (undated) DEVICE — APPLICATOR MEDICATED 26 CC SOLUTION HI LT ORNG CHLORAPREP

## (undated) DEVICE — 1010 S-DRAPE TOWEL DRAPE 10/BX: Brand: STERI-DRAPE™

## (undated) DEVICE — COVER,MAYO STAND,STERILE: Brand: MEDLINE

## (undated) DEVICE — C-ARM: Brand: UNBRANDED

## (undated) DEVICE — SPONGE,LAP,18"X18",DLX,XR,ST,5/PK,40/PK: Brand: MEDLINE

## (undated) DEVICE — PACK ARTHRO III ST SIRUS

## (undated) DEVICE — GOWN,SIRUS,POLYRNF,XLN/3XL,18/CS: Brand: MEDLINE

## (undated) DEVICE — BANDAGE,GAUZE,BULKEE II,4.5"X4.1YD,STRL: Brand: MEDLINE

## (undated) DEVICE — SPONGE GZ W4XL4IN COT 12 PLY TYP VII WVN C FLD DSGN

## (undated) DEVICE — MARKER SURG SKIN GENTIAN VLT REG TIP W/ 6IN RUL

## (undated) DEVICE — INTENDED FOR TISSUE SEPARATION, AND OTHER PROCEDURES THAT REQUIRE A SHARP SURGICAL BLADE TO PUNCTURE OR CUT.: Brand: BARD-PARKER ® CARBON RIB-BACK BLADES

## (undated) DEVICE — GOWN,AURORA,NONREINFORCED,LARGE: Brand: MEDLINE

## (undated) DEVICE — SINGLE PORT MANIFOLD: Brand: NEPTUNE 2